# Patient Record
Sex: FEMALE | Race: OTHER | Employment: FULL TIME | URBAN - METROPOLITAN AREA
[De-identification: names, ages, dates, MRNs, and addresses within clinical notes are randomized per-mention and may not be internally consistent; named-entity substitution may affect disease eponyms.]

---

## 2021-08-10 NOTE — TELEPHONE ENCOUNTER
Patient calling to initiate prenatal care  LMP 7/11/21  Patient is 7-8 weeks on 9/5/21  Confirmation Ultrasound and Appointment scheduled on   Future Appointments   Date Time Provider Kodak Cruz   9/9/2021  3:30 PM [facility] OB PFLD US [facility] OB[facility]N P [facility] 12

## 2021-08-11 NOTE — TELEPHONE ENCOUNTER
LMP: 21  EDC based on lmp: 22    8 wks on ; appt on  at 8 4/7 wks        Are cycles regular?: yes    Medical problems: denies    Past sx hx: denies    Current medications: PNV- advised on folic acid and DHA    Past pregnancy complic

## 2021-09-09 NOTE — PROGRESS NOTES
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     LMP: 21  =  8w4d,      EDC: 22    Scan: CRL = 8w4d =

## 2021-10-11 NOTE — PROGRESS NOTES
Here for initial prenatal visit with our group. 33year old  at 13w1d by LMP. Patient's last menstrual period was 2021 (exact date). She has never had a pap smear. She has no medical history to complicate this pregnancy.      OB History

## 2021-10-18 NOTE — TELEPHONE ENCOUNTER
Patient had questions regarding lab results and how long it takes [id number] to come back. Advised patient. No further questions or concerns.

## 2021-11-09 PROBLEM — Z34.90 ENCOUNTER FOR SUPERVISION OF NORMAL PREGNANCY, ANTEPARTUM (HCC): Status: ACTIVE | Noted: 2021-11-09

## 2021-11-09 PROBLEM — R87.810 CERVICAL HIGH RISK HUMAN PAPILLOMAVIRUS (HPV) DNA TEST POSITIVE: Status: ACTIVE | Noted: 2021-11-09

## 2021-11-09 NOTE — PROGRESS NOTES
Low risk cfDNA   Male fetus    HARPREET. 17w2d    Doing well. No complaints. Denies abdominal/pelvic pain or vaginal bleeding.    Rh pos   Genetic screening - low risk cfDNA   Recommend Anatomy scan in 3-4 weeks  Prenatal labs reviewed     RTC in 4 weeks

## 2021-12-06 NOTE — PROGRESS NOTES
21w1d seen for routine OB visit. Denies ctx, lof, vb. Reports +FM.     Patient Active Problem List:     Cervical high risk human papillomavirus (HPV) DNA test positive     Encounter for supervision of normal pregnancy, antepartum       Gen: AAOx3,

## 2022-01-03 PROBLEM — Z34.02 ENCOUNTER FOR SUPERVISION OF NORMAL FIRST PREGNANCY IN SECOND TRIMESTER (HCC): Status: ACTIVE | Noted: 2021-11-09

## 2022-01-03 NOTE — PROGRESS NOTES
HARPREET  Doing well,  GOOD FM  Denies VB/LOF/uctx  Couseled on TDAP  Advised completion of labs  RTC in 3-4 wks

## 2022-01-10 PROBLEM — O99.019 ANEMIA DURING PREGNANCY (HCC): Status: ACTIVE | Noted: 2022-01-10

## 2022-01-10 PROBLEM — O99.810 ABNORMAL GLUCOSE TOLERANCE TEST IN PREGNANCY (HCC): Status: ACTIVE | Noted: 2022-01-10

## 2022-02-03 NOTE — PROGRESS NOTES
Patient presents with:  Prenatal Care: tia    Routine prenatal visit. Patient without complaints. Good fetal movement  Patient denies any bleeding, leaking fluid, cramping, or contractions. Assessment/Plan:  29w4d doing well  1 hour GTT, CBC done. Needs 3 hr GTT  HIV ordered. Anemia- encouraged Fe supplement  Blood type A pos  Reviewed vaccine recommendations: Tdap today, Covid needs booster. Kick count information given. Reviewed  labor signs and symptoms. Diagnoses and all orders for this visit:    Prenatal care, antepartum  -     URINALYSIS NONAUTO W/O SCOPE (OB URISTIX)    Need for vaccination  -     TETANUS, DIPHTHERIA TOXOIDS AND ACELLULAR PERTUSIS VACCINE (TDAP), >7 YEARS, IM USE      Return in about 2 weeks (around 2022) for Routine Prenatal Visit, Labs Next Available.

## 2022-02-07 PROBLEM — O24.419 GESTATIONAL DIABETES MELLITUS (GDM), ANTEPARTUM (HCC): Status: ACTIVE | Noted: 2022-02-07

## 2022-02-07 NOTE — PROGRESS NOTES
Results are diagnostic for gestational diabetes. Referral placed to diabetic education, patient to start fasting and 2hr postprandial blood sugar checks.

## 2022-02-09 NOTE — PROGRESS NOTES
[facility]   1990 was seen for Gestational Diabetes Counseling: Individual    Date: 2022  Referring Provider: Dr. 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 This week wake-up wrap at DD  L: leftovers or frozen Lean Cuisine etc with jello or fruit  Sn: fruit popsicle  D: slow cooker chicken, ground beef/taco night  HS: none  Beverages: coffee small every other day, cranberry juice, water  Exercise: no time or energy, the usual household and work/school activities    Education:     GDM Overview:  Reviewed gestational diabetes as diagnosis including target blood glucose values. Benefits, risks, and management options for improving/maintaining glucose control to mother/baby discussed. Healthy Eating:  Discussed nutrition concepts for pregnancy/healthy eating and effects of food on BG value. Timing of meals; what is a carbohydrate, protein, fat. Taught: Carb counting, label reading, meal planning. Suggested Calorie Level: 2000    Being Active:  Benefits and effects of activity on BG discussed. Reviewed types of recommended activity, duration, precautions, and when to call MD.    Monitoring:  Instructed on how to use glucose monitor/proper lancet disposal. Testing schedules are:   Fastin-95 mg/dL, Call MD is >105 md/dL twice in 1 week   2 Hour Post Prandial:  120 md/dL, Call MD if >140 md/dL twice in 1 week. Instructed /demonstrated ability to perform blood glucose testing on: One Touch Verio Reflect   mg/dL after a snack  Discussed monitoring ketones. Taking Medication:  Reviewed when medication might be indicated. Reducing Risk:  Effects of elevated blood glucose on mother/baby reviewed.   Discussed management (hyperglycemia, hypoglycemia, sick day, DKA, other) and when to call provider. Post pregnancy management/prevention of Type 2 DM, and increased risk of having diabetes later in life reviewed. Healthy Coping:  Family involvement/social support encouraged. Identification of lifestyle behaviors willing to change discussed. Training Tools Provided:  [place] Diabetes and Pregnancy: A guide to self management  BG Log Sheets    Recommendations:      1. Follow recommended meal plan. 2. Begin testing fasting glucose and 2 hour after meals   3. Bring glucose / food log to next MD office visit. 4. Encouraged activity if no restrictions. 5. Encouraged Anjelmikel Bhanu to call diabetes center with any questions or concerns. Script for One Touch Verio test strips and One Touch Delica lancets sent to preferred pharmacy. Patient verbalized understanding and has no further questions at this time. Emailed/written materials provided for all topics covered.   Thank you for the referral.  Scheduled pt to follow-up x 1 with the Diabetes Center 2/23  [facility] MS, RD, [address]

## 2022-02-17 PROBLEM — Z34.03 ENCOUNTER FOR SUPERVISION OF NORMAL FIRST PREGNANCY IN THIRD TRIMESTER (HCC): Status: ACTIVE | Noted: 2021-11-09

## 2022-02-17 PROBLEM — Z34.02 ENCOUNTER FOR SUPERVISION OF NORMAL FIRST PREGNANCY IN SECOND TRIMESTER (HCC): Status: RESOLVED | Noted: 2021-11-09 | Resolved: 2022-02-17

## 2022-02-17 NOTE — PROGRESS NOTES
HARPREET  Doing well, occasional Aroldo Rodrigez  GOOD FM  Denies VB/LOF/uctx  She has had 1 elevated fasting blood sugar, 2 post- prandial have been elevated. She is having difficulty checking after dinner because she goes to bed right away. Encouraged compliance.    RTC in 2 wks  Fetal movement instructions given

## 2022-02-23 NOTE — PROGRESS NOTES
14 [facility]  7/11/1990 was seen for Gestational Diabetes Education Follow-Up    2/23/2022    Referring Provider: Dr. Gloria Ragland Start time: 3:30  End time: 4:00      Diet:  pt reports she is having carbs and protein with each meal as instructed and snacks between roly at HS. FBS's:  Most are less than 95; 96 x 1; 98 x 1  Post-Prandials:  Most are <120    Hypoglycemia: declines but discussed Rule of 15 for treating hypoglycemia    Calcium intake: yogurt, milk. Having some cheese. Reinforced 4 calcium servings/day while pregnant and breastfeeding. 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 Discussed increased risk of Type 2 diabetes within the next 5-15 years especially if overweight and/or with a family history of Type 2 diabetes. Discussed importance of regular physical activity and weight control after breastfeeding completed. Gave her instruction for MyFitness Pal to help reduce weight after breastfeeding completed. Patient verbalized understanding and has no further questions at this time. No further follow-up anticipated for diabetes education.   Thank you for the referral.  Jesse Owens MS, RD, CDCES, LDN

## 2022-03-05 NOTE — PROGRESS NOTES
HARPREET    GA: 33w6d   22  1018   BP: 100/60   Weight: 188 lb (85.3 kg)       Doing well, +FM  Denies LOF/VB/uctx  Rh positive, TDAP received, EPDS 4  PTL and Fetal movement instructions given  GDMA1, patient reports she has not been as consistent checking her blood sugars. However reports that her fastings are less than 95 and her postprandials overall are less than 120 with an occasional 130s. Patient advised to continue to check her blood sugars 4 times a day. Patient advised to send blood sugar report via [employer]. Discussed with patient importance of monitoring blood glucose in pregnancy. Discussed with patient left upper quadrant abdominal pain. Abdominal pain near the subcostal region. Likely musculoskeletal.  No current pain. Discussed with patient the use of maternity shirts, increase water, warm/cold compresses and stretching as needed. Precautions provided. RTC in 2 wks      Note to patient and family   The [facility] makes medical notes available to patients in the interest of transparency. However, please be advised that this is a medical document. It is intended as pyrb-aw-istf communication. It is written and medical language may contain abbreviations or verbiage that are technical and unfamiliar. It may appear blunt or direct. Medical documents are intended to carry relevant information, facts as evident, and the clinical opinion of the practitioner.

## 2022-03-21 PROBLEM — Z34.90 PREGNANCY (HCC): Status: ACTIVE | Noted: 2022-03-21

## 2022-03-21 NOTE — PROGRESS NOTES
HARPREET  Doing well, +FM  Denies VB/LOF/uctx  Mode of delivery:   anticipated  Labor precautions discussed   GBS collected   She notes significant increase in swelling over the last week, more in the last few days.    She denies any headaches, vision changes, epigastric pain  She has 2+ pitting edema to BLE  Will send to L&D to rule out PIH

## 2022-03-22 PROBLEM — O14.93 PRE-ECLAMPSIA IN THIRD TRIMESTER (HCC): Status: ACTIVE | Noted: 2022-03-22

## 2022-03-22 NOTE — PLAN OF CARE
Problem: ANTEPARTUM/LABOR and DELIVERY  Goal: Maintain pregnancy as long as maternal and/or fetal condition is stable  Description: INTERVENTIONS:  - Maternal surveillance  - Fetal surveillance  - Monitor uterine activity  - Medications as ordered  - Bedrest  3/22/2022 0047 by Marko Reddy RN  Outcome: Progressing  3/22/2022 0046 by Marko Reddy RN  Outcome: Progressing  Goal: Anxiety is at manageable level  Description: INTERVENTIONS:  - [place] patient to unit/surroundings  - Establish a trusting relationship with patient  - Discuss possible complications or alterations in birth plan  - Explain treatment plan  - Explain testing/procedures prior to initiation  - Encourage participation in care  - Encourage verbalization of concerns/fears  - Identify coping mechanisms  - Administer/offer alternative therapies (Aroma therapy, distraction, guided imagery, massage, music therapy, therapeutic touch)  - Manage patient's environment (Avoid overstimulation of patient)  3/22/2022 0047 by Marko Reddy RN  Outcome: Progressing  3/22/2022 0046 by Marko Reddy RN  Outcome: Progressing  Goal: Demonstrates ability to cope with hospitalization/illness  Description: INTERVENTIONS:  - Encourage verbalization of feelings/concerns/expectations  - Provide quiet environment  - Assist patient to identify own strengths and abilities  - Encourage patient to set small goals for self  - Encourage participation in diversional activity  - Reinforce positive adaptation of new coping behaviors  - Include patient/family/caregiver in decisions  3/22/2022 0047 by Marko Reddy RN  Outcome: Progressing  3/22/2022 0046 by Marko Reddy RN  Outcome: Progressing     Problem: BIRTH - VAGINAL/ SECTION  Goal: Fetal and maternal status remain reassuring during the birth process  Description: INTERVENTIONS:  - Monitor vital signs  - Monitor fetal heart rate  - Monitor uterine activity  - Monitor labor progression (vaginal delivery)  - DVT prophylaxis (C/S delivery)  - Surgical antibiotic prophylaxis (C/S delivery)  3/22/2022 0047 by Dasha Ritter RN  Outcome: Progressing  3/22/2022 0046 by Dasha Ritter RN  Outcome: Progressing     Problem: PAIN - ADULT  Goal: Verbalizes/displays adequate comfort level or patient's stated pain goal  Description: INTERVENTIONS:  - Encourage pt to monitor pain and request assistance  - Assess pain using appropriate pain scale  - Administer analgesics based on type and severity of pain and evaluate response  - Implement non-pharmacological measures as appropriate and evaluate response  - Consider cultural and social influences on pain and pain management  - Manage/alleviate anxiety  - Utilize distraction and/or relaxation techniques  - Monitor for opioid side effects  - Notify MD/LIP if interventions unsuccessful or patient reports new pain  - Anticipate increased pain with activity and pre-medicate as appropriate  3/22/2022 0047 by Dasha Ritter RN  Outcome: Progressing  3/22/2022 0046 by Dasha Ritter RN  Outcome: Progressing

## 2022-03-22 NOTE — PLAN OF CARE
Problem: ANTEPARTUM/LABOR and DELIVERY  Goal: Maintain pregnancy as long as maternal and/or fetal condition is stable  Description: INTERVENTIONS:  - Maternal surveillance  - Fetal surveillance  - Monitor uterine activity  - Medications as ordered  - Bedrest  Outcome: Progressing  Goal: Anxiety is at manageable level  Description: INTERVENTIONS:  - [place] patient to unit/surroundings  - Establish a trusting relationship with patient  - Discuss possible complications or alterations in birth plan  - Explain treatment plan  - Explain testing/procedures prior to initiation  - Encourage participation in care  - Encourage verbalization of concerns/fears  - Identify coping mechanisms  - Administer/offer alternative therapies (Aroma therapy, distraction, guided imagery, massage, music therapy, therapeutic touch)  - Manage patient's environment (Avoid overstimulation of patient)  Outcome: Progressing  Goal: Demonstrates ability to cope with hospitalization/illness  Description: INTERVENTIONS:  - Encourage verbalization of feelings/concerns/expectations  - Provide quiet environment  - Assist patient to identify own strengths and abilities  - Encourage patient to set small goals for self  - Encourage participation in diversional activity  - Reinforce positive adaptation of new coping behaviors  - Include patient/family/caregiver in decisions  Outcome: Progressing

## 2022-03-22 NOTE — TELEPHONE ENCOUNTER
----- Message from Yelitza Griffiths MD sent at 3/22/2022 10:50 AM CDT -----  Induction scheduled Monday 3/28/22 9 pm cytotec.   x

## 2022-03-22 NOTE — CM/SW NOTE
Team rounds done on patient. Team reviewed MD orders and possible discharge needs. Team members present: LEELEE Mckinnon; Vish Sharpe, WILLIAM Case Manager; and RN caring for patient.

## 2022-03-22 NOTE — PROGRESS NOTES
FETAL NONSTRESS TEST:  Baseline FHR: 120-130 per minute  Fetal heart variability: moderate  Fetal Heart Rate accelerations: 15x15   Fetal Heart Rate decelerations: none  Tracing category 1  Fetal Non-stress Test Interpretation: reactive    Tita Downing MD  [address] [place] [address] & Gynecology

## 2022-03-22 NOTE — PLAN OF CARE
Problem: ANTEPARTUM/LABOR and DELIVERY  Goal: Maintain pregnancy as long as maternal and/or fetal condition is stable  Description: INTERVENTIONS:  - Maternal surveillance  - Fetal surveillance  - Monitor uterine activity  - Medications as ordered  - Bedrest  Outcome: Progressing  Goal: Anxiety is at manageable level  Description: INTERVENTIONS:  - [place] patient to unit/surroundings  - Establish a trusting relationship with patient  - Discuss possible complications or alterations in birth plan  - Explain treatment plan  - Explain testing/procedures prior to initiation  - Encourage participation in care  - Encourage verbalization of concerns/fears  - Identify coping mechanisms  - Administer/offer alternative therapies (Aroma therapy, distraction, guided imagery, massage, music therapy, therapeutic touch)  - Manage patient's environment (Avoid overstimulation of patient)  Outcome: Progressing  Goal: Demonstrates ability to cope with hospitalization/illness  Description: INTERVENTIONS:  - Encourage verbalization of feelings/concerns/expectations  - Provide quiet environment  - Assist patient to identify own strengths and abilities  - Encourage patient to set small goals for self  - Encourage participation in diversional activity  - Reinforce positive adaptation of new coping behaviors  - Include patient/family/caregiver in decisions  Outcome: Progressing     Problem: BIRTH - VAGINAL/ SECTION  Goal: Fetal and maternal status remain reassuring during the birth process  Description: INTERVENTIONS:  - Monitor vital signs  - Monitor fetal heart rate  - Monitor uterine activity  - Monitor labor progression (vaginal delivery)  - DVT prophylaxis (C/S delivery)  - Surgical antibiotic prophylaxis (C/S delivery)  Outcome: Progressing     Problem: PAIN - ADULT  Goal: Verbalizes/displays adequate comfort level or patient's stated pain goal  Description: INTERVENTIONS:  - Encourage pt to monitor pain and request assistance  - Assess pain using appropriate pain scale  - Administer analgesics based on type and severity of pain and evaluate response  - Implement non-pharmacological measures as appropriate and evaluate response  - Consider cultural and social influences on pain and pain management  - Manage/alleviate anxiety  - Utilize distraction and/or relaxation techniques  - Monitor for opioid side effects  - Notify MD/LIP if interventions unsuccessful or patient reports new pain  - Anticipate increased pain with activity and pre-medicate as appropriate  Outcome: Progressing

## 2022-03-22 NOTE — PROGRESS NOTES
Patient transported to [facility] via wheelchair in stable condition.  No c/o. Greg Mendez RN

## 2022-03-23 NOTE — PROGRESS NOTES
Pt discharged to home with written instructions per wheelchair. S& S of pre eclampsia reviewed. Pt expresses verbal understanding. Follow up instructions reviewed and pt states she has made all appointments before induction Monday.

## 2022-03-25 NOTE — PROGRESS NOTES
Patient presents with:  Prenatal Care: tia after nst    Routine prenatal visit without complaints. Patient denies any bleeding, leaking fluid, cramping, or regular uterine contractions. Good fetal movement. NST: see report. Reactive  Assessment/Plan:  36w5d doing well  GBS neg  Preeclampsia- Induction Monday night. No headaches, visual changes or epigastric pain. Reviewed labor signs and symptoms. Diagnoses and all orders for this visit:    Prenatal care, antepartum  -     URINALYSIS NONAUTO W/O SCOPE (OB URISTIX)    Pre-eclampsia in third trimester  -     FETAL NON-STRESS TEST EMG ONLY 01442; Future    Diet controlled gestational diabetes mellitus (GDM), antepartum  -     FETAL NON-STRESS TEST EMG ONLY 82068; Future       Return in about 6 weeks (around 5/6/2022) for Postpartum Visit.

## 2022-03-28 NOTE — TELEPHONE ENCOUNTER
LA paperwork dropped off no payment made today pt will call with payment placed in [facility]

## 2022-03-28 NOTE — TELEPHONE ENCOUNTER
----- Message from Haile Art MD sent at 3/25/2022  2:41 PM CDT -----  Patient scheduled for induction Monday night 9 pm.  x

## 2022-03-29 NOTE — PROGRESS NOTES
Pt transferred to 1107, vital signs stbale for pt and baby on transfer, All pt belongings sent with pt on transfer

## 2022-03-29 NOTE — PROGRESS NOTES
Pt complaints of x1 gush of fluid leak @ 0925am  No speculum exam  NItrazine swab turned blue on swabbing  Ferning negative in the slide

## 2022-03-29 NOTE — PLAN OF CARE
Problem: BIRTH - VAGINAL/ SECTION  Goal: Fetal and maternal status remain reassuring during the birth process  Description: INTERVENTIONS:  - Monitor vital signs  - Monitor fetal heart rate  - Monitor uterine activity  - Monitor labor progression (vaginal delivery)  - DVT prophylaxis (C/S delivery)  - Surgical antibiotic prophylaxis (C/S delivery)  Outcome: Progressing     Problem: PAIN - ADULT  Goal: Verbalizes/displays adequate comfort level or patient's stated pain goal  Description: INTERVENTIONS:  - Encourage pt to monitor pain and request assistance  - Assess pain using appropriate pain scale  - Administer analgesics based on type and severity of pain and evaluate response  - Implement non-pharmacological measures as appropriate and evaluate response  - Consider cultural and social influences on pain and pain management  - Manage/alleviate anxiety  - Utilize distraction and/or relaxation techniques  - Monitor for opioid side effects  - Notify MD/LIP if interventions unsuccessful or patient reports new pain  - Anticipate increased pain with activity and pre-medicate as appropriate  Outcome: Progressing     Problem: ANXIETY  Goal: Will report anxiety at manageable levels  Description: INTERVENTIONS:  - Administer medication as ordered  - Teach and rehearse alternative coping skills  - Provide emotional support with 1:1 interaction with staff  Outcome: Progressing     Problem: Patient/Family Goals  Goal: Patient/Family Long Term Goal  Description: Patient's Long Term Goal: Uncomplicated vaginal delivery    Interventions:  VS per protocol  I&O  Ice chips and sips as tolerated  EFM per protocol  Maintain IV as ordered  Antibiotics as needed per protocol  Informed consent       Interventions:  -   - See additional Care Plan goals for specific interventions  Outcome: Progressing  Goal: Patient/Family Short Term Goal  Description: Patient's Short Term Goal: Adequate pain control with delivery of infant  Interventions:  Pain assessment scores as ordered  Patient scores pain a \"3\" or less  Multidisciplinary care   Nonpharmacologic comfort measures         Interventions:   -   - See additional Care Plan goals for specific interventions  Outcome: Progressing

## 2022-03-29 NOTE — PROGRESS NOTES
pt at 37.1 weeks gest presents to L+D for scheduled IOL for pre-eclampsia. Pt is accompanied by spouse. Pt signs consents and is oriented to unit and room.  This RN to place pt on efm and toco and obtain VS.

## 2022-03-29 NOTE — ANESTHESIA PROCEDURE NOTES
Labor Analgesia  Performed by: Manuela Rodarte MD  Authorized by: Manuela Rodarte MD       General Information and Staff    Start Time:  3/29/2022 11:30 AM  End Time:  3/29/2022 11:40 AM  Anesthesiologist:  Manuela Rodarte MD  Performed by:   Anesthesiologist  Patient Location:  OB  Site Identification: surface landmarks    Reason for Block: labor epidural    Preanesthetic Checklist: patient identified, IV checked, risks and benefits discussed, monitors and equipment checked, pre-op evaluation, timeout performed, IV bolus, anesthesia consent and sterile technique used      Procedure Details    Patient Position:  Sitting  Prep: ChloraPrep    Monitoring:  Heart rate and continuous pulse ox  Approach:  Midline    Epidural Needle    Injection Technique:  GEOVANNY saline  Needle Type:  Tuohy  Needle Gauge:  17 G  Needle Length:  3.375 in  Needle Insertion Depth:  4  Location:  L3-4    Spinal Needle      Catheter    Catheter Type:  End hole  Catheter Size:  19 G  Catheter at Skin Depth:  9  Test Dose:  Negative    Assessment  Sensory Level:  T4    Additional Comments     Fentanyl 2 mc/ml + Ropivicaine 0.15% epidural infusion 12cc/hr  Fentanyl 2 mc/ml + Ropivicaine 0.15% epidural bolus 6cc with fentanyl 100 mcg

## 2022-03-29 NOTE — L&D DELIVERY NOTE
Ion Downing [[id number]]    Labor Events     labor?: No   steroids?: None  Cervical ripening date/time: 3/28/2022 2236  Cervical ripening type: Misoprostol  Antibiotics (enter # doses in comment): none  Rupture date/time: 3/29/2022 1155     Rupture type: AROM  Fluid color: Clear  Induction: Misoprostol, Oxytocin, AROM  Indications for induction: Mild Preeclampsia, IDDM/GDDM  Augmentation: None  Intrapartum & labor complications: Variable decelerations, Preeclampsia     Labor Length    1st stage: 4h 23m  2nd stage: 0h 46m  3rd stage: 0h 03m     Labor Event Times    Labor onset date/time: 3/29/2022 0900  Dilation complete date/time: 3/29/2022 1323  Start pushing date/time: 3/29/2022 1345     [place] Presentation    Presentation: Vertex  Position: Left Occiput Anterior     Operative Delivery    Operative Vaginal Delivery: N/A            Shoulder Dystocia    Shoulder Dystocia: N/A     Anesthesia    Method: Epidural           Delivery    Delivery date/time:  3/29/22 14:09:00   Delivery type: Normal spontaneous vaginal delivery    Details:     Delivery location: delivery room     Delivery Providers    Delivering Clinician: Aria Green MD   Delivery personnel:  Provider Role   Oksana Rivero RN Baby Nurse   Hector Handy, RN Delivery Nurse         Cord    Vessels: 3 Vessels  Complications: None  Time in sec: 43  Cord blood disposition: to lab  Gases sent?: No     Resuscitation    Method: None      Measurements    Weight: 3500 g 7 lb 11.5 oz Length: 49.5 cm   Head circum. : 34 cm Chest circum.: 33 cm      Abdominal circum.: 32 cm       Placenta    Date/time: 3/29/2022 1412  Removal: Spontaneous  Appearance: Intact  Disposition: held for future pathology     Apgars    Living status: Living   Apgar Scoring Key:    0 1 2    Skin color Blue or pale Acrocyanotic Completely pink    Heart rate Absent <100 bpm >100 bpm    Reflex irritability No response Grimace Cry or active withdrawal Muscle tone Limp Some flexion Active motion    Respiratory effort Absent Weak cry; hypoventilation Good, crying              1 Minute:  5 Minute:  10 Minute:  15 Minute:  20 Minute:    Skin color: 1  1       Heart rate: 2  2       Reflex irritablity: 2  2       Muscle tone: 2  2       Respiratory effort: 2  2       Total: 9  9          Apgars assigned by: MARISOL THOMAS  [place] disposition: with mother     Skin to Skin    Skin to skin initiated date/time: 3/29/2022 1430  Skin to skin with: Mother     Vaginal Count    Initial count RN: Yvrose Garcia RN  Initial count Tech: Andrae Mg   Sponges   Sharps    Initial counts 11   0    Final counts 21   3    Final count RN: Yvrose Garcia RN  Final count MD: Oumou Baldwin MD     Delivery (Maternal)    Episiotomy: None  Perineal lacerations: 2nd Repaired?: Yes   Vaginal laceration?: No    Cervical laceration?: No    Clitoral laceration?: No Repaired?: No   Quantitative blood loss (mL): 567            Vaginal Delivery Note    [facility] Patient Status:  Inpatient    1990 MRN [id number]   Location [address] [place] [address] Attending Dameon James MD   [facility] PCP Unknown Pcp     Date of Delivery: 22    Pre Op Dx:  IUP at 37w2d, Preeclampsia without severe features, A1GDM    Post Op Dx: Same - delivered    Procedure: Normal Spontaneous Vaginal Delivery    Surgeon: Karthik Quigley MD      Anesthesia: Epidural    QBL:  567 cc      Findings:      Sex: Male     \"Jerome\"   Weight: 3500 g        Apgars: 9/9    Nuchal cord: None    Lacerations: 2nd degree perineal laceration      Procedure: The patients was placed in the dorsolithotomy position and prepped. She was encouraged to push. As the head was delivered in OA position, the legs were lowered and the perineum was supported to decrease the risk of tearing. The shoulders rotated easily and delivery was completed without complication. Bulb suction was performed.   The cord was doubly clamped then cut after 30 seconds of cord pulsation noted. The baby was placed on the mother's abdomen at her request.  The cord blood was sampled and/or banked. Placenta delivered spontaneosly by uterine massage. IV Pitocin was then initiated. The uterus was explored and evacuation of blood clots noted. Uterus noted to be firm. Good hemostasis noted. The perineum, vaginal mucosa and cervix were then examined. The 2nd degree perineal laceration repaired with 2-0 vicryl. Bleeding was minimal.  The patient was then moved to the supine position in stable condition. Sponge and instrument counts were correct. Complications:  None    Mother and infant in good condition.     MD Joshua   [address]

## 2022-03-30 NOTE — PROGRESS NOTES
Report received from RADHA BURKETT [facility] for transfer of care to M/B after magnesium D/C'd. Pt resting in bed w/baby & SO @ bedside . POC reviewed w/pt.

## 2022-03-31 NOTE — PLAN OF CARE
Problem: POSTPARTUM  Goal: Long Term Goal:Experiences normal postpartum course  Description: INTERVENTIONS:  - Assess and monitor vital signs and lab values. - Assess fundus and lochia. - Provide ice/sitz baths for perineum discomfort. - Monitor healing of incision/episiotomy/laceration, and assess for signs and symptoms of infection and hematoma. - Assess bladder function and monitor for bladder distention.  - Provide/instruct/assist with pericare as needed. - Provide VTE prophylaxis as needed. - Monitor bowel function.  - Encourage ambulation and provide assistance as needed. - Assess and monitor emotional status and provide social service/psych resources as needed. - Utilize standard precautions and use personal protective equipment as indicated. Ensure aseptic care of all intravenous lines and invasive tubes/drains.  - Obtain immunization and exposure to communicable diseases history. Outcome: Completed  Goal: Optimize infant feeding at the breast  Description: INTERVENTIONS:  - Initiate breast feeding within first hour after birth. - Monitor effectiveness of current breast feeding efforts. - Assess support systems available to mother/family.  - Identify cultural beliefs/practices regarding lactation, letdown techniques, maternal food preferences. - Assess mother's knowledge and previous experience with breast feeding.  - Provide information as needed about early infant feeding cues (e.g., rooting, lip smacking, sucking fingers/hand) versus late cue of crying.  - Discuss/demonstrate breast feeding aids (e.g., infant sling, nursing footstool/pillows, and breast pumps). - Encourage mother/other family members to express feelings/concerns, and actively listen. - Educate father/SO about benefits of breast feeding and how to manage common lactation challenges.   - Recommend avoidance of specific medications or substances incompatible with breast feeding.  - Assess and monitor for signs of nipple pain/trauma. - Instruct and provide assistance with proper latch. - Review techniques for milk expression (breast pumping) and storage of breast milk. Provide pumping equipment/supplies, instructions and assistance, as needed. - Encourage rooming-in and breast feeding on demand.  - Encourage skin-to-skin contact. - Provide LC support as needed. - Assess for and manage engorgement. - Provide breast feeding education handouts and information on community breast feeding support. Outcome: Completed  Goal: Establishment of adequate milk supply with medication/procedure interruptions  Description: INTERVENTIONS:  - Review techniques for milk expression (breast pumping). - Provide pumping equipment/supplies, instructions, and assistance until it is safe to breastfeed infant. Outcome: Completed  Goal: Appropriate maternal -  bonding  Description: INTERVENTIONS:  - Assess caregiver- interactions. - Assess caregiver's emotional status and coping mechanisms. - Encourage caregiver to participate in  daily care. - Assess support systems available to mother/family.  - Provide /case management support as needed.   Outcome: Completed     Problem: COPING  Goal: Pt/Family able to verbalize concerns and demonstrate effective coping strategies  Description: INTERVENTIONS:  - Assist patient/family to identify coping skills, available support systems and cultural and spiritual values  - Provide emotional support, including active listening and acknowledgement of concerns of patient and caregivers  - Reduce environmental stimuli, as able  - Instruct patient/family in relaxation techniques, as appropriate  - Assess for spiritual and psychosocial needs and initiate Spiritual Care or Behavioral Health consult as needed  Outcome: Completed

## 2022-03-31 NOTE — PROGRESS NOTES
Baby breastfeeding fair and bottlefding well and mom pumping, all dc teaching reviewed earlier and all questions answered. Pt states comfortable caring for self and baby.   Discharged in stable condition with family and accompanied by staff at [phone] per wc

## 2022-04-01 NOTE — PROGRESS NOTES
1st attempt OBGYN Post Partum apt request  (delivered 03/29)    Dr SHEIKH [facility]  [facility]  [address]  [phone]  Follow up:  4 days - BP Check -  pt has existing BP check apt Mon 04/04 @1:00pm  6 weeks - pt has existing apt Wed 05/11 @12:15pm  LVM to call [phone] to assist w/scheduling apt; will try again  Closing encounter

## 2022-04-01 NOTE — TELEPHONE ENCOUNTER
Paperwork completed and signed. Faxed to [phone]. [facility] for pt its completed.     Copy sent to scanning  Copy in plfd

## 2022-04-03 NOTE — PROGRESS NOTES
04/03/22 1452   Cradle Call Follow up   Cradle call follow up date 04/03/22   Follow up needed Completed   Hypertension or Preeclampsia during pregnancy or delivery Yes   Did patient go home on any antihypertensive meds? No   Does patient have a follow-up appt within 2 weeks? Yes  (Monday; BP stable and denies CNS complaints)   Outgoing Cradle Call completed: Mom reports that she and infant are doing well. Has had pediatrician F/U visit. Reminded to schedule postpartum follow up visit. No complaints of PPD. Reviewed basic self and infant care. Encouraged to follow up w/ MD's w/ further question/concerns.   Invited to Cradle Talk Group

## 2022-04-06 NOTE — LACTATION NOTE
LACTATION NOTE - MOTHER      Evaluation Type: Outpatient Initial    Problems identified  Problems identified: Knowledge deficit;Milk supply not WNL; Nipple pain (Mild engorgement in axilla)  Milk supply not WNL: Reduced (potential)  Problems Identified Other: Infant born at 45 1/6 weeks gestation, sleepy feeder  Receiving supplements. Maternal history  Maternal history: Induction of labor;PIH;Gestational diabetes; Anemia  Other/comment: Received magnesium after delivery for elevated BP, venofer for Hgb 7.7    Breastfeeding goal  Breastfeeding goal: To maintain breast milk feeding per patient goal    Maternal Assessment  Bilateral Breasts: Full;Symmetrical (Mild engorgement upper outer quadrant near axilla)  Bilateral Nipples: Everted;Colostrum easily expressed; Sore  Prior breastfeeding experience (comment below): Primip  Breastfeeding Assistance: Breastfeeding assistance provided with permission    Pain assessment  Pain, additional: Pain w/initial sucks only  Location/Comment: Rated pain # 4 with initial latch then decreases. Denied discomfort with BF when assisted with deeper latch skills  Treatment of Sore Nipples: Deeper latch techniques; Expressed breast milk    Guidelines for use of:  Breast pump type: Spectra  Current use of pump[de-identified] 3 times at night  Suggested use of pump: Pump each time a supplement is offered  Reported pumping volumes (ml): 30-60  Post-feed pumped volume: 26 ml (R 12 ml/ L 14 ml)  Other (comment): Enc to use the breast pump if infant is sleepy with feedings.

## 2022-05-11 PROBLEM — Z34.03 ENCOUNTER FOR SUPERVISION OF NORMAL FIRST PREGNANCY IN THIRD TRIMESTER (HCC): Status: RESOLVED | Noted: 2021-11-09 | Resolved: 2022-05-11

## 2024-01-22 NOTE — TELEPHONE ENCOUNTER
Patient calling to initiate prenatal care  LMP 12/22  Patient is 7-8 weeks on 2/16  Confirmation Ultrasound and Appointment scheduled on   Future Appointments   Date Time Provider Department Center   2/21/2024 10:15 AM [facility] [address]   2/21/2024 12:15 PM Geetha Hale MD [facility] [address]   3/18/2024  1:30 PM Heron Marquez MD [facility] [place]         Any history of ectopic pregnancy? no  Any history of miscarriage? no  Any medications that you are taking on a regular basis other than prenatal vitamins? Was taking iron but stopped taking (if not taking prenatal vitamins, encourage patient to start taking.)  Any bleeding since the first day of last LMP and your positive pregnancy test? no    Insurance bcbs ppo

## 2024-02-21 ENCOUNTER — OFFICE VISIT (OUTPATIENT)
Dept: OBGYN CLINIC | Facility: CLINIC | Age: 34
End: 2024-02-21
Payer: COMMERCIAL

## 2024-02-21 ENCOUNTER — ULTRASOUND ENCOUNTER (OUTPATIENT)
Dept: OBGYN CLINIC | Facility: CLINIC | Age: 34
End: 2024-02-21
Payer: COMMERCIAL

## 2024-02-21 VITALS
DIASTOLIC BLOOD PRESSURE: 76 MMHG | WEIGHT: 208 LBS | SYSTOLIC BLOOD PRESSURE: 126 MMHG | HEART RATE: 94 BPM | BODY MASS INDEX: 35.51 KG/M2 | HEIGHT: 64 IN

## 2024-02-21 DIAGNOSIS — N91.1 SECONDARY AMENORRHEA: ICD-10-CM

## 2024-02-21 DIAGNOSIS — N91.2 AMENORRHEA: ICD-10-CM

## 2024-02-21 DIAGNOSIS — Z32.01 PREGNANCY EXAMINATION OR TEST, POSITIVE RESULT (HCC): Primary | ICD-10-CM

## 2024-02-21 PROBLEM — O99.210 OBESITY AFFECTING PREGNANCY, ANTEPARTUM (HCC): Status: ACTIVE | Noted: 2024-02-21

## 2024-02-21 PROBLEM — Z34.90 PREGNANCY (HCC): Status: RESOLVED | Noted: 2022-03-21 | Resolved: 2024-02-21

## 2024-02-21 PROBLEM — Z86.32 HISTORY OF GESTATIONAL DIABETES: Status: ACTIVE | Noted: 2024-02-21

## 2024-02-21 PROBLEM — O14.93 PRE-ECLAMPSIA IN THIRD TRIMESTER (HCC): Status: RESOLVED | Noted: 2022-03-22 | Resolved: 2024-02-21

## 2024-02-21 PROBLEM — O09.529 ANTEPARTUM MULTIGRAVIDA OF ADVANCED MATERNAL AGE (HCC): Status: ACTIVE | Noted: 2024-02-21

## 2024-02-21 PROBLEM — Z87.59 HISTORY OF PRE-ECLAMPSIA: Status: ACTIVE | Noted: 2024-02-21

## 2024-02-21 PROCEDURE — 76856 US EXAM PELVIC COMPLETE: CPT | Performed by: OBSTETRICS & GYNECOLOGY

## 2024-02-21 PROCEDURE — 99214 OFFICE O/P EST MOD 30 MIN: CPT | Performed by: OBSTETRICS & GYNECOLOGY

## 2024-02-21 PROCEDURE — 3078F DIAST BP <80 MM HG: CPT | Performed by: OBSTETRICS & GYNECOLOGY

## 2024-02-21 PROCEDURE — 3074F SYST BP LT 130 MM HG: CPT | Performed by: OBSTETRICS & GYNECOLOGY

## 2024-02-21 PROCEDURE — 3008F BODY MASS INDEX DOCD: CPT | Performed by: OBSTETRICS & GYNECOLOGY

## 2024-02-21 NOTE — PATIENT INSTRUCTIONS
The ADA and ACOG define patients at increased risk of type 2 diabetes based on: body mass index (BMI) ?25 kg/m2 (?23 kg/m2 in  Americans) plus one or more of the following [2,28]:    GDM in a previous pregnancy.    Glycated hemoglobin ?5.7 percent (39 mmol/mol), impaired glucose tolerance, or impaired fasting glucose on previous testing.    First-degree relative with diabetes.    High-risk race/ethnicity (eg, , , ,  American, ).    History of cardiovascular disease.    Hypertension (?140/90 mmHg) or on therapy for hypertension.    High-density lipoprotein cholesterol level <35 mg/dL (0.90 mmol/L) and/or a triglyceride level >250 mg/dL (2.82 mmol/L).    Polycystic ovary syndrome (PCOS).    Physical inactivity.    Other clinical condition associated with insulin resistance (eg, severe obesity, acanthosis nigricans).        Candidates    Low-dose aspirin 81 mg: Take 2 tablets by mouth daily starting at 12 weeks until 36 weeks      Selecting high risk women for prophylaxis -- The greatest benefit appears to be in women at moderate to high risk of developing the disease [10,13-20], in whom a 62 percent reduction of  preeclampsia occurred in the ASPRE trial [21]. We recommend low-dose aspirin prophylaxis for women at high risk for preeclampsia. There is no consensus on the exact criteria that confer high risk. It is reasonable to use the USPSTF high-risk criteria, which are also endorsed by the American College of Obstetricians and Gynecologists (ACOG) [22,23]. The incidence of preeclampsia is estimated to be at least 8 percent for pregnant women with any one of these high risk factors:    ?Previous pregnancy with preeclampsia, especially early onset and with an adverse outcome.    ?Type 1 or 2 diabetes mellitus.    ?Chronic hypertension.    ?Multifetal gestation.    ?Kidney disease.    ?Autoimmune disease with potential vascular complications  (antiphospholipid syndrome, systemic lupus erythematosus).      We generally follow the USPSTF criteria and recommend low-dose aspirin for preeclampsia prevention to patients with two or more of the following moderate risk factors [22]:    ?Nulliparity.    ?Obesity (body mass index >30 kg/m2).    ?Family history of preeclampsia in mother or sister.    ?Age ?35 years.    ?Sociodemographic characteristics (Black persons, lower income level [recognizing that these are not biological factors]).    ?Personal risk factors (eg, previous pregnancy with low birth weight or small for gestational age infant, previous adverse pregnancy outcome [eg, stillbirth], interval >10 years between pregnancies).           [facility]- Prenatal Testing    The following information is designed to help you understand some of the optional tests that are available to you to screen for problems in your pregnancy.  Before considering any of these tests, you will need to consider the following questions:    [1] What would you do if an abnormality were detected?  If the answer is nothing, then you may decide to decline all testing.  [2] Would you be willing to undergo testing which might increase your risk of miscarriage, such as an amniocentesis or CVS (see below)?  [3] If you have the initial testing, and it indicates that there might be a problem, and you subsequently decide not to do invasive testing such as an amniocentesis, would you worry excessively through the remainder of the pregnancy?    The following tests are available to screen for problems in your pregnancy:    [1]  First Trimester Screening (also called Nuchal Thickness, Nuchal Translucency or NT)- This is an abdominal ultrasound done between 10 and 14 weeks by a perinatology specialist (Maternal Fetal Medicine, MFM) which measures the thickness of the skin behind your baby's neck.  Increased thickness of the skin in this area has been linked to an increased risk of  genetic abnormalities like Down Syndrome.  A second visit may be required if the baby is not in the correct position.  The ultrasound results are combined with a finger stick blood test to increase the sensitivity of the test.  You will need to schedule an appointment with the Maternal Fetal Medicine office.  Address and phone number below:  Please verify insurance coverage:  CPT code: 34932 (savage) or 99506 (twins)  Diagnosis: AMA (advanced maternal age) - O09.529  Maternal Fetal Medicine  Dr. Suh, Dr. Diehl, Dr. Ricardo, and Dr. Joy  [address]  [place] [address]  Phone [phone]  Fax [phone]    [2] Cell-Free DNA testing (NIPT)- This is a blood test done any time after 10-11 weeks which screens for genetic abnormalities like Down Syndrome.  It is greater than 98% sensitive but requires an amniocentesis for confirmation if abnormal.  It can also tell you the sex of the baby.  CPT code: 33107  Diagnosis code: AMA (advanced maternal age) - O09.529  Testing options:   [facility]- preferred for IHP patients.  Please call [phone] to discuss billing, prior authorizations or referrals  [facility]- Preferred for patients who use [facility].  Please call [phone] to discuss billing, prior authorizations or referrals  [id number]- Optional for all insurance plans except [facility].  Please call [facility] at [phone] to discuss billing, prior authorizations, or referrals    [3]  Alpha Fetoprotein (AFP, MSAFP)- This is a simple blood test that screens for neural tube defects such as spina bifida (an abnormal opening in the spine).  It is usually performed around 16-20 weeks.  Additional testing such as a Level II ultrasound may be recommended for an abnormal test result.  Please verify insurance coverage:  CPT code: 19273 Diagnosis code: Genetic Screening- Z36.8A    [4] Cystic Fibrosis/SMA Carrier Screening- Cystic Fibrosis is a genetic disease which causes lung problems in the infant.  SMA  (spinal muscular atrophy) is a genetic disease that affects the nerve cells of the spinal cord.  These genetic defects would need to be passed from both parents to the child.  A blood test is performed on the mother, and if her test is positive, then the father should also be tested. These tests can be done at any time in the pregnancy, usually in the first trimester with your initial OB labs.  All babies are screened for cystic fibrosis after birth.  Please verify insurance coverage:  Cystic Fibrosis CPT Code: 76422 Diagnosis code: Cystic Fibrosis screening- Z13.228  SMA CPT Code: 37806 Diagnosis code: Genetic Screening- Z36.8A or Testing for Genetic Disease Carrier- Z13.71    [5]  Level II Ultrasound-  This is an abdominal ultrasound done at approximately 20-21 weeks by a perinatology specialist (JULIAN) at [facility] which looks at many of the baby's internal structures.  Abnormalities in certain structures have been associated with an increased risk of genetic abnormalities.  It also screens for NTD's.  This ultrasound would replace the Level I Ultrasound that we normally perform at our office around the same time.    [6]  Amniocentesis-  During this procedure, a needle is passed through your abdominal wall to withdrawal some amniotic fluid from around the baby.  It screens for both genetic abnormalities and NTD's and is usually performed around 15-16 weeks.  This test has the highest accuracy for detecting genetic abnormalities, but there may be a small risk of miscarriage or other complications.  A similar procedure called Chorionic Villus Sampling (CVS) is performed by passing a catheter through the vagina to remove a small sample of tissue from the placenta (afterbirth).  CVS may have a higher risk of miscarriage and other rare complications compared to amniocentesis but can be performed earlier in pregnancy.  Amniocentesis is often recommended/offered if any of the previously mentioned tests come back  abnormal.  A pamphlet is available if you would like more information about this option.  If you decide to have an amniocentesis, the other tests would be unnecessary.      The above information covers some of the basics.  Pamphlets on the Cell-Free DNA test, Quad Screen and Cystic Fibrosis test should be in your prenatal packet.  Your doctor will discuss this information in more detail, and feel free to ask additional questions.  Also, remember that all of these tests are optional, and will only be performed at your request.  Testing that needs to be done through the perinatologist's office may require 2-3 weeks lead time to schedule.              Foods to Avoid During Pregnancy  Deli Meats- You may eat deli meats from the deli.  If you eat deli meats in the package, it may be contaminated with Listeria. Heat all deli meats in a package to 165 degrees Fahrenheit before eating, even if the label states the meat is “pre-cooked”.    Soft Cheeses and Unpasteurized Products - You may eat soft cheeses that are pasteurized.  Please avoid all soft cheeses, milk or juice that is unpasteurized.  Fish- avoid fish that contains a high level of mercury. These include but are not limited to; Gates, Orange Roughy, Tile Fish, Shark, Swordfish, and Bravo Mackerel. Please see chart below for good fish choices in pregnancy. Also avoid any raw, uncooked shellfish such as oysters, clams, or sushi.    Make sure to cook all meats; including ground beef, pork, and poultry to their desired temperatures before consuming. This reduces the chance of a food borne illness.  Caffeine- limit to 200 mg or an 8oz cup daily or less.   Alcohol- no amount of alcohol is determined to be safe in pregnancy. Do not drink any alcoholic beverages while pregnant.        Medications Safe in Pregnancy  The following over-the-counter medications may be taken safely after 12 weeks gestation by any patient who is pregnant.  Please follow the instructions on the  package for adult dosage.  If you experience any symptoms prior to 12 weeks, please call the office at [phone].      Colds/Congestion: Flonase, Saline Nasal Spray, Neti Pot, Plain Robitussin, Robitussin DM, Mucinex DM, Vicks 44 E, Vicks Vapor rub, Cough drops without Zinc or Sudafed.   Contact your doctor if you have a persistent fever over 100.4, shortness of breath, coughing up green mucus, or a sore throat that persists from more than 3 days    Diarrhea: Imodium, Maalox Anti-Diarrheal or Kaopectate  Contact the office if you have diarrhea for more than 3 days.    Allergies: Benadryl, Claritin or Zyrtec    Hemorrhoids: Tucks pads, Preparation H, Annusol, Sitz bath or Witch hazel  Eat a high fiber diet and drink plenty of fluids.    Yeast: Monistat 3 or 7  Call if your symptoms do not improve, or if you experience vaginal bleeding, or a watery discharge.    Constipation: Metamucil, Colace, fiber supplement, Milk of Magnesia or Dulcolax  Drink plenty of fluids, eat high-fiber foods and take the above laxatives sporadically.     Headache or Mild aches and pain: Extra Strength Tylenol     Gas: Gas X, Gelusil, Papaya Tablets, Maalox, Mylicon or Mylanta Gas    Heartburn: Tums, Mylanta, Pepcid AC or Maalox    Sore throat: Alcohol free Chloraseptic spray or Lozenges     Nausea and Vomiting: ½ tablet Unisom plus 100mg of Vitamin B6 at bedtime (may increase B6 up to 200mg per day), Alejandra root tea or raspberry leaf tea    Insomnia: Tylenol PM, Vitamin B6 50mg, warm bath or milk, Unisom, Nytol or Sominex.     We have listed a few medications for common symptoms seen in pregnancy.  Please contact the office if you are unsure about any over the counter medications that are not listed above.

## 2024-02-21 NOTE — PROGRESS NOTES
[facility]  Obstetrics and Gynecology    Subjective:     Luba Lane is a 33 year old  female presents with c/o secondary amenorrhea and positive pregnancy test. The patient was recommended to return for further evaluation. The patient reports doing well. Patient's last menstrual period was 2023 (exact date)..     Review of Systems:  General: no complaints per category. See HPI for additional information.   Breast: no complaints per category. See HPI for additional information.   Respiratory: no complaints per category. See HPI for additional information.   Cardiovascular: no complaints per category. See HPI for additional information.   GI: no complaints per category. See HPI for additional information.   : no complaints per category. See HPI for additional information.   Heme: no complaints per category. See HPI for additional information.     OB History:   OB History    Para Term  AB Living   3 1 1 0 1 1   SAB IAB Ectopic Multiple Live Births   0 0 0 0 1      # Outcome Date GA Lbr Jerrell/2nd Weight Sex Delivery Anes PTL Lv   3 Current            2 Term 22 37w2d 04:23  00:46 7 lb 11.5 oz (3.5 kg) M NORMAL SPONT EPI N DMITRIY      Complications: Variable decelerations, Preeclampsia   1 AB 2017 7w0d              Gyne History:  Menarche: 9  Period Cycle (Days): Pregnant  Period Duration (Days): n/a  Period Flow: n/a  Hx Prior Abnormal Pap: Yes  Pap Date: 10/11/21  Pap Result Notes: abnormal  Patient's last menstrual period was 2023 (exact date).      Meds:  Current Outpatient Medications on File Prior to Visit   Medication Sig Dispense Refill    Prenatal 27-0.8 MG Oral Tab Take 1 tablet by mouth daily.      Ferrous Sulfate (IRON OR) Take by mouth. (Patient not taking: Reported on 2024)       No current facility-administered medications on file prior to visit.       All:  No Known Allergies    PMH:  Past Medical History:   Diagnosis Date    Anemia      Gestational diabetes (HCC)     Pregnancy-induced hypertension (HCC)        PSH:  Past Surgical History:   Procedure Laterality Date    OTHER  2017    elective         Objective:     Vitals:    24 1206   BP: 126/76   Pulse: 94   Weight: 208 lb (94.3 kg)   Height: 64\"         Body mass index is 35.7 kg/m².    General: AAO.NAD.   CVS exam: normal peripheral perfusion  Chest: non-labored breathing, no tachypnea   Abdominal exam: deferred   Pelvic exam: deferred          Imaging:  First Trimester US   GA by LMP: 8w5d   GA by US: 8w5d   FHT: 169 bpm   Impression: Single live IUP. Early viable. Gestational sac, yolk sac and fetal pole seen. Both ovaries wnl. No free fluid. Cardiac activity noted. Cervix appears closed and wnl.   NOEMI 24 by LMP     Reviewed and electronically signed by: Geetha Hale MD, 24, 12:40 PM        Assessment:     Luba Lane is a 33 year old  female who presents for secondary amenorrhea and positive pregnancy test          Plan:     Patient Active Problem List    Diagnosis    History of pre-eclampsia     Low dose ASA  [ ] baseline labs   [ ] PCR      History of gestational diabetes     [ ] early 1 hr GTT      Antepartum multigravida of advanced maternal age (HCC)     Low dose ASA  [ ] MFM w/ level 2 US  [ ] growth   [ ] NST         Obesity affecting pregnancy, antepartum (HCC)     [ ] early 1 hr GTT  [ ] MFM       Postpartum care and examination of lactating mother (HCC)    Gestational diabetes mellitus (GDM), antepartum (HCC)    Anemia during pregnancy (HCC)    Cervical high risk human papillomavirus (HPV) DNA test positive     Neg 16/18/45  Repeat cotesting in 1 year ()             Secondary amenorrhea  - a/w positive pregnancy test  - US noted for single viable IUP at 8w5d   - Pt counseled on safety, diet, exercise, caffiene, tobacco, ETOH, sexual activity, ER precautions, diet, exercise, appropriate otc medication use, substance abuse   -  Counseled on taking a PNV with 0.4mg of folic acid   - genetic screening testing d/w patient and family, pt declines invasive diagnostic testing and considering first versus second trimester screening   - advised to follow up to establish prenatal care   - SAB precautions provided   - d/w nausea and vomiting in pregnancy including vitamin B 6 and unisom   - COVID 19 precautions provided, recommend vaccination and booster if/when applicable     All of the findings and plan were discussed with the patient.  She notes understanding and agrees with the plan of care.  All questions were answered to the best of my ability at this time.      Total patient time was 38 minutes in evaluation, consultation, and coordination of care. This included face to face and non-face to face actions. The patient's questions and concerns that were addressed.     RTC in 4 weeks or sooner if needed     Geetha Hale MD   [facility] - OBGYN     Discussed with patient that there will not be further notification of normal or benign results other than receiving results on [employer]. A [employer] message or telephone call will be placed by the physician and/or office staff if results are abnormal.         Note to patient and family   The 21st Century Cures Act makes medical notes available to patients in the interest of transparency.  However, please be advised that this is a medical document.  It is intended as agoa-ei-rgqm communication.  It is written and medical language may contain abbreviations or verbiage that are technical and unfamiliar.  It may appear blunt or direct.  Medical documents are intended to carry relevant information, facts as evident, and the clinical opinion of the practitioner.      This note could include assistance by Dragon voice recognition. Errors in content may be related to improper recognition by the system; efforts to review and correct have been done but errors may still exist.

## 2024-03-18 ENCOUNTER — INITIAL PRENATAL (OUTPATIENT)
Dept: OBGYN CLINIC | Facility: CLINIC | Age: 34
End: 2024-03-18
Payer: COMMERCIAL

## 2024-03-18 ENCOUNTER — PATIENT MESSAGE (OUTPATIENT)
Dept: OBGYN CLINIC | Facility: CLINIC | Age: 34
End: 2024-03-18

## 2024-03-18 VITALS
HEART RATE: 101 BPM | WEIGHT: 209.13 LBS | SYSTOLIC BLOOD PRESSURE: 128 MMHG | HEIGHT: 64 IN | BODY MASS INDEX: 35.7 KG/M2 | DIASTOLIC BLOOD PRESSURE: 72 MMHG

## 2024-03-18 DIAGNOSIS — Z3A.12 12 WEEKS GESTATION OF PREGNANCY (HCC): Primary | ICD-10-CM

## 2024-03-18 DIAGNOSIS — Z12.4 CERVICAL CANCER SCREENING: ICD-10-CM

## 2024-03-18 DIAGNOSIS — O09.529 ANTEPARTUM MULTIGRAVIDA OF ADVANCED MATERNAL AGE (HCC): ICD-10-CM

## 2024-03-18 PROCEDURE — 87086 URINE CULTURE/COLONY COUNT: CPT | Performed by: STUDENT IN AN ORGANIZED HEALTH CARE EDUCATION/TRAINING PROGRAM

## 2024-03-18 PROCEDURE — 87591 N.GONORRHOEAE DNA AMP PROB: CPT | Performed by: STUDENT IN AN ORGANIZED HEALTH CARE EDUCATION/TRAINING PROGRAM

## 2024-03-18 PROCEDURE — 87491 CHLMYD TRACH DNA AMP PROBE: CPT | Performed by: STUDENT IN AN ORGANIZED HEALTH CARE EDUCATION/TRAINING PROGRAM

## 2024-03-18 PROCEDURE — 3074F SYST BP LT 130 MM HG: CPT | Performed by: STUDENT IN AN ORGANIZED HEALTH CARE EDUCATION/TRAINING PROGRAM

## 2024-03-18 PROCEDURE — 3008F BODY MASS INDEX DOCD: CPT | Performed by: STUDENT IN AN ORGANIZED HEALTH CARE EDUCATION/TRAINING PROGRAM

## 2024-03-18 PROCEDURE — 87625 HPV TYPES 16 & 18 ONLY: CPT | Performed by: STUDENT IN AN ORGANIZED HEALTH CARE EDUCATION/TRAINING PROGRAM

## 2024-03-18 PROCEDURE — 3078F DIAST BP <80 MM HG: CPT | Performed by: STUDENT IN AN ORGANIZED HEALTH CARE EDUCATION/TRAINING PROGRAM

## 2024-03-18 PROCEDURE — 87147 CULTURE TYPE IMMUNOLOGIC: CPT | Performed by: STUDENT IN AN ORGANIZED HEALTH CARE EDUCATION/TRAINING PROGRAM

## 2024-03-18 PROCEDURE — 87624 HPV HI-RISK TYP POOLED RSLT: CPT | Performed by: STUDENT IN AN ORGANIZED HEALTH CARE EDUCATION/TRAINING PROGRAM

## 2024-03-18 RX ORDER — ASPIRIN 81 MG/1
81 TABLET ORAL DAILY
COMMUNITY

## 2024-03-18 NOTE — PATIENT INSTRUCTIONS
[facility]  CLINIC  [address], [address]  [place] [address]  [phone]  Number of Visits: 1

## 2024-03-18 NOTE — PROGRESS NOTES
[facility]  Obstetrics and Gynecology  History & Physical    CC: Patient is here to establish prenatal care     Subjective:     HPI:  Luba Lane is a 33 year old  female at 12w3d who presents today to establish prenatal care. Patient reports improvement in n/v.  Denies vaginal bleeding, abdominal/pelvic pain.     LMP: Patient's last menstrual period was 2023 (exact date).  NOEMI:  Estimated Date of Delivery: 24  Dated by: LMP    OB:  OB History    Para Term  AB Living   3 1 1 0 1 1   SAB IAB Ectopic Multiple Live Births   0 0 0 0 1      # Outcome Date GA Lbr Jerrell/2nd Weight Sex Delivery Anes PTL Lv   3 Current            2 Term 22 37w2d 04:23 / 00:46 7 lb 11.5 oz (3.5 kg) M NORMAL SPONT EPI N DMITRIY      Complications: Variable decelerations, Preeclampsia   1 AB  7w0d              PMH:   Past Medical History:   Diagnosis Date    Anemia     Gestational diabetes (HCC)     Pregnancy-induced hypertension (HCC)        PSH:    Past Surgical History:   Procedure Laterality Date    OTHER  2017    elective         MEDS:  Current Outpatient Medications on File Prior to Visit   Medication Sig Dispense Refill    aspirin 81 MG Oral Tab EC Take 1 tablet (81 mg total) by mouth daily.      Ferrous Sulfate (IRON OR) Take by mouth.      Prenatal 27-0.8 MG Oral Tab Take 1 tablet by mouth daily.       No current facility-administered medications on file prior to visit.       Allergies:    No Known Allergies      Immunizations:  Immunization History   Administered Date(s) Administered    Covid-19 Vaccine Moderna 100 mcg/0.5 ml 02/10/2021, 03/10/2021, 2022    MMR 2022    TDAP 2022    Tb Intradermal Test 2017, 2017, 2018       Family Hx:      Family History   Problem Relation Age of Onset    Diabetes Father     Cancer Father        SocialHx:        Social History     Socioeconomic History    Marital status:    Tobacco Use     Smoking status: Never    Smokeless tobacco: Never   Vaping Use    Vaping Use: Never used   Substance and Sexual Activity    Alcohol use: Not Currently     Comment: occ    Drug use: Never    Sexual activity: Yes     Partners: Male   Other Topics Concern    Caffeine Concern Yes     Comment: OCC    Exercise No    Seat Belt Yes     Social Determinants of Health     Financial Resource Strain: Low Risk  (3/18/2024)    Financial Resource Strain     Difficulty of Paying Living Expenses: Not hard at all     Med Affordability: No   Food Insecurity: No Food Insecurity (3/18/2024)    Food Insecurity     Food Insecurity: Never true   Transportation Needs: No Transportation Needs (3/18/2024)    Transportation Needs     Lack of Transportation: No   Stress: No Stress Concern Present (3/18/2024)    Stress     Feeling of Stress : No   Housing Stability: Low Risk  (3/18/2024)    Housing Stability     Housing Instability: No       Review of Systems:  General:  denies fevers, chills, fatigue and malaise  Eyes:  no visual changes, denies headaches  Breast:  denies rashes, skin changes, pain, lumps or discharge   Respiratory:  denies SOB, dyspnea, cough or wheezing  Cardiovascular:  denies chest pain, palpitations  GI: denies abdominal pain, diarrhea, constipation  :  denies dysuria, hematuria, increased urinary frequency.   Heme:  denies history of excessive bleeding or bruising  Psychiatric:  denies depression, anxiety    Objective:     PE:  Vitals: /72   Pulse 101   Ht 64\"   Wt 209 lb 2 oz (94.9 kg)   LMP 12/21/2023 (Exact Date)   BMI 35.90 kg/m²   Gen: A/O, NAD  Head/Neck: neck supple, thyroid non-enlarged, symmetric and without nodules  Breasts: breasts symmetric, no dominant or suspicious mass, no skin or nipple changes and no axillary adenopathy  CV: normal peripheral perfusion   Lungs: no tachypnea, non-labored breathing   Abdominal exam: soft, nontender, nondistended  Ext: non-tender, no edema  :  1) External  Genitalia -  Normal appearing skin and hair distribution, no visible lesions.   2) Vagina:  mucosa appears pink, and well rugated.   3) Cervix:  smooth, with no visible lesions, erosions, or scars, os closed. No bleeding noted.   4) Uterus: Anteverted. Mobile.   5) Adnexa/parametria:  Non tender adnexa. No masses.     Fetal Well Being Assessment:     BPM    Assessment/Plan:     Luba Lane is a 33 year old  female at 12w3d who presents today to establish prenatal care.    Patient Active Problem List    Diagnosis    History of pre-eclampsia     Low dose ASA  [ ] baseline labs   [ ] PCR      History of gestational diabetes     [ ] early 1 hr GTT      Antepartum multigravida of advanced maternal age (HCC)     Low dose ASA  [ ] MFM w/ level 2 US  [ ] growth   [ ] NST         Obesity affecting pregnancy, antepartum (HCC)     [ ] early 1 hr GTT  [ ] MFM       Gestational diabetes mellitus (GDM), antepartum (HCC)    Anemia during pregnancy (HCC)    Cervical high risk human papillomavirus (HPV) DNA test positive     Neg 16/18/45  Repeat cotesting in 1 year ()           Prenatal care  - prenatal labs ordered  - Pap:  collected and ordered   - Cervical cultures: GC, Chl  collected and ordered   - continue PNV daily given        Genetic Screening tests -->[id number] ordered  - Discussion held with patient about genetic screening. Discussion including first trimester versus second trimester screening. Patient offered Amniocentesis and  declined. Pt considering first or second trimester screening. Advised to call the office if she would like to pursue first trimester screening as recommended to complete at 13wks GA    Genetic Carrier screening  Discussed CF, hemoglobinopathies and SMA screening in the patient. The patient and her partner are not of Ashkenazi [place], Syriac [place] or Cajun descent and have no family history of Ravi Sach's Disease; thus they are not candidates for screening of Ravi  Delvin's.       Patient education  - Pt counseled on safety, diet, exercise, caffiene, tobacco, ETOH, sexual activity, ER precautions, diet, exercise, appropriate weight gain, travel, appropriate otc medication use, substance abuse and pets.  - Counseled on taking a PNV with 0.4mg of folic acid   - Limiting intake of alcohol, coffee, tea, soda, and other foods containing caffeine  - Limit intake of fish to twice weekly to limit mercury exposure  - COVID 19 precautions provided    - Pregnancy BMI guidelines: Prepregnancy BMI 35   <18.5 = underweight = 28-40 lbs gain = 1 lb/wk (2nd/3rd tri)   18.5 - 24.9 = normal = 25 - 35 lbs gain = 1 lb/wk   25 - 29.9 = overweight = 15 - 25 lbs gain = 0.6 lb/wk   > 30 = obese = 11 - 20 lbs gain = 0.5 lb/wk     Patient is a candidate for aspirin. Aspirin should be initiated between 12 to 28 weeks, but ideally before 16 weeks. Risk factors include: hx of preeclampsia        Patient is a candidate for an early glucola screen. We discussed that we Consider testing in all women who are overweight or obese (ie, have a body mass index greater than 25 or greater than 23 in  Americans) and have additional risk factor. Her additional risk factor is Hx of GDM      Heron Marquez MD      Discussed with patient that there will not be further notification of normal or benign results other than receiving results on [employer]. A [employer] message or telephone call will be placed by the physician and/or office staff if results are abnormal.     Note to patient and family   The 21st Century Cures Act makes medical notes available to patients in the interest of transparency.  However, please be advised that this is a medical document.  It is intended as zbkc-vu-tmcc communication.  It is written and medical language may contain abbreviations or verbiage that are technical and unfamiliar.  It may appear blunt or direct.  Medical documents are intended to carry relevant information, facts as evident, and  the clinical opinion of the practitioner.

## 2024-03-19 NOTE — TELEPHONE ENCOUNTER
From: Luba Lane  To: Heron Marquez  Sent: 3/18/2024 2:30 PM CDT  Subject: Chewable Aspirin Question    I’m attaching some photos of the low dose aspirin I’ve been taking.

## 2024-03-20 LAB
C TRACH DNA SPEC QL NAA+PROBE: NEGATIVE
N GONORRHOEA DNA SPEC QL NAA+PROBE: NEGATIVE

## 2024-03-21 LAB
HPV I/H RISK 1 DNA SPEC QL NAA+PROBE: POSITIVE
HPV16 DNA CVX QL PROBE+SIG AMP: NEGATIVE
HPV18 DNA CVX QL PROBE+SIG AMP: NEGATIVE

## 2024-03-22 ENCOUNTER — LAB ENCOUNTER (OUTPATIENT)
Dept: LAB | Facility: HOSPITAL | Age: 34
End: 2024-03-22
Attending: STUDENT IN AN ORGANIZED HEALTH CARE EDUCATION/TRAINING PROGRAM
Payer: COMMERCIAL

## 2024-03-22 DIAGNOSIS — Z3A.12 12 WEEKS GESTATION OF PREGNANCY (HCC): ICD-10-CM

## 2024-03-22 LAB
ALBUMIN SERPL-MCNC: 3.2 G/DL (ref 3.4–5)
ALBUMIN/GLOB SERPL: 0.8 {RATIO} (ref 1–2)
ALP LIVER SERPL-CCNC: 49 U/L
ALT SERPL-CCNC: 14 U/L
ANION GAP SERPL CALC-SCNC: 5 MMOL/L (ref 0–18)
ANTIBODY SCREEN: NEGATIVE
AST SERPL-CCNC: 7 U/L (ref 15–37)
BASOPHILS # BLD AUTO: 0.03 X10(3) UL (ref 0–0.2)
BASOPHILS NFR BLD AUTO: 0.4 %
BILIRUB SERPL-MCNC: 0.2 MG/DL (ref 0.1–2)
BUN BLD-MCNC: 8 MG/DL (ref 9–23)
CALCIUM BLD-MCNC: 9.6 MG/DL (ref 8.5–10.1)
CHLORIDE SERPL-SCNC: 107 MMOL/L (ref 98–112)
CO2 SERPL-SCNC: 25 MMOL/L (ref 21–32)
CREAT BLD-MCNC: 0.9 MG/DL
CREAT UR-SCNC: 127 MG/DL
EGFRCR SERPLBLD CKD-EPI 2021: 85 ML/MIN/1.73M2 (ref 60–?)
EOSINOPHIL # BLD AUTO: 0.12 X10(3) UL (ref 0–0.7)
EOSINOPHIL NFR BLD AUTO: 1.4 %
ERYTHROCYTE [DISTWIDTH] IN BLOOD BY AUTOMATED COUNT: 12.9 %
FASTING STATUS PATIENT QL REPORTED: NO
GLOBULIN PLAS-MCNC: 4.1 G/DL (ref 2.8–4.4)
GLUCOSE BLD-MCNC: 101 MG/DL (ref 70–99)
HBV SURFACE AG SER-ACNC: <0.1 [IU]/L
HBV SURFACE AG SERPL QL IA: NONREACTIVE
HCT VFR BLD AUTO: 36 %
HCV AB SERPL QL IA: NONREACTIVE
HGB BLD-MCNC: 12.1 G/DL
IMM GRANULOCYTES # BLD AUTO: 0.06 X10(3) UL (ref 0–1)
IMM GRANULOCYTES NFR BLD: 0.7 %
LYMPHOCYTES # BLD AUTO: 1.82 X10(3) UL (ref 1–4)
LYMPHOCYTES NFR BLD AUTO: 21.3 %
MCH RBC QN AUTO: 26.9 PG (ref 26–34)
MCHC RBC AUTO-ENTMCNC: 33.6 G/DL (ref 31–37)
MCV RBC AUTO: 80 FL
MONOCYTES # BLD AUTO: 0.47 X10(3) UL (ref 0.1–1)
MONOCYTES NFR BLD AUTO: 5.5 %
NEUTROPHILS # BLD AUTO: 6.05 X10 (3) UL (ref 1.5–7.7)
NEUTROPHILS # BLD AUTO: 6.05 X10(3) UL (ref 1.5–7.7)
NEUTROPHILS NFR BLD AUTO: 70.7 %
OSMOLALITY SERPL CALC.SUM OF ELEC: 282 MOSM/KG (ref 275–295)
PLATELET # BLD AUTO: 286 10(3)UL (ref 150–450)
POTASSIUM SERPL-SCNC: 3.7 MMOL/L (ref 3.5–5.1)
PROT SERPL-MCNC: 7.3 G/DL (ref 6.4–8.2)
PROT UR-MCNC: <5 MG/DL
RBC # BLD AUTO: 4.5 X10(6)UL
RH BLOOD TYPE: POSITIVE
RUBV IGG SER QL: POSITIVE
RUBV IGG SER-ACNC: 99.9 IU/ML (ref 10–?)
SODIUM SERPL-SCNC: 137 MMOL/L (ref 136–145)
T PALLIDUM AB SER QL IA: NONREACTIVE
WBC # BLD AUTO: 8.6 X10(3) UL (ref 4–11)

## 2024-03-25 ENCOUNTER — LAB ENCOUNTER (OUTPATIENT)
Dept: LAB | Facility: HOSPITAL | Age: 34
End: 2024-03-25
Attending: STUDENT IN AN ORGANIZED HEALTH CARE EDUCATION/TRAINING PROGRAM
Payer: COMMERCIAL

## 2024-03-25 DIAGNOSIS — Z3A.12 12 WEEKS GESTATION OF PREGNANCY (HCC): ICD-10-CM

## 2024-03-25 DIAGNOSIS — E74.39 GLUCOSE INTOLERANCE: Primary | ICD-10-CM

## 2024-03-25 LAB
.: NORMAL
.: NORMAL
GLUCOSE 1H P GLC SERPL-MCNC: 175 MG/DL
VZV IGG SER IA-ACNC: 1035 (ref 165–?)

## 2024-03-25 PROCEDURE — 36415 COLL VENOUS BLD VENIPUNCTURE: CPT

## 2024-03-25 PROCEDURE — 82950 GLUCOSE TEST: CPT

## 2024-03-27 LAB
11Q23 DELETION (JACOBSEN): NOT DETECTED
15Q11 DELETION (PW ANGELMAN): NOT DETECTED
1P36 DELETION SYNDROME: NOT DETECTED
22Q11 DELETION (DIGEORGE): NOT DETECTED
4P16 DELETION(WOLF-HIRSCHHORN): NOT DETECTED
5P15 DELETION (CRI-DU-CHAT): NOT DETECTED
8Q24 DELETION (LANGER-GIEDION): NOT DETECTED
GESTATIONAL AGE > OR = 9W:: YES
MONOSOMY X (TURNER SYNDROME): NOT DETECTED
TEST RESULT: NEGATIVE
TRISOMY 13 (PATAU SYNDROME): NEGATIVE
TRISOMY 16: NOT DETECTED
TRISOMY 18 (EDWARDS SYNDROME): NEGATIVE
TRISOMY 21 (DOWN SYNDROME): NEGATIVE
TRISOMY 22: NOT DETECTED
XXX (TRIPLE X SYNDROME): NOT DETECTED
XXY (KLINEFELTER SYNDROME): NOT DETECTED
XYY (JACOBS SYNDROME): NOT DETECTED

## 2024-04-14 ENCOUNTER — LAB ENCOUNTER (OUTPATIENT)
Dept: LAB | Facility: HOSPITAL | Age: 34
End: 2024-04-14
Attending: STUDENT IN AN ORGANIZED HEALTH CARE EDUCATION/TRAINING PROGRAM
Payer: COMMERCIAL

## 2024-04-14 DIAGNOSIS — E74.39 GLUCOSE INTOLERANCE: ICD-10-CM

## 2024-04-14 LAB
GLUCOSE 1H P GLC SERPL-MCNC: 202 MG/DL
GLUCOSE 2H P GLC SERPL-MCNC: 151 MG/DL
GLUCOSE 3H P GLC SERPL-MCNC: 156 MG/DL (ref 70–140)
GLUCOSE P FAST SERPL-MCNC: 110 MG/DL

## 2024-04-14 PROCEDURE — 82952 GTT-ADDED SAMPLES: CPT

## 2024-04-14 PROCEDURE — 36415 COLL VENOUS BLD VENIPUNCTURE: CPT

## 2024-04-14 PROCEDURE — 82951 GLUCOSE TOLERANCE TEST (GTT): CPT

## 2024-04-18 DIAGNOSIS — O24.419 GESTATIONAL DIABETES MELLITUS (GDM), ANTEPARTUM, GESTATIONAL DIABETES METHOD OF CONTROL UNSPECIFIED (HCC): Primary | ICD-10-CM

## 2024-04-22 ENCOUNTER — ROUTINE PRENATAL (OUTPATIENT)
Dept: OBGYN CLINIC | Facility: CLINIC | Age: 34
End: 2024-04-22
Payer: COMMERCIAL

## 2024-04-22 VITALS
BODY MASS INDEX: 36 KG/M2 | SYSTOLIC BLOOD PRESSURE: 120 MMHG | DIASTOLIC BLOOD PRESSURE: 80 MMHG | HEART RATE: 107 BPM | WEIGHT: 210 LBS

## 2024-04-22 DIAGNOSIS — O09.529 ANTEPARTUM MULTIGRAVIDA OF ADVANCED MATERNAL AGE (HCC): ICD-10-CM

## 2024-04-22 DIAGNOSIS — Z34.80 SUPERVISION OF OTHER NORMAL PREGNANCY, ANTEPARTUM (HCC): Primary | ICD-10-CM

## 2024-04-22 DIAGNOSIS — Z87.59 HISTORY OF PRE-ECLAMPSIA: ICD-10-CM

## 2024-04-22 DIAGNOSIS — Z86.32 HISTORY OF GESTATIONAL DIABETES: ICD-10-CM

## 2024-04-22 DIAGNOSIS — O99.210 OBESITY AFFECTING PREGNANCY, ANTEPARTUM, UNSPECIFIED OBESITY TYPE (HCC): ICD-10-CM

## 2024-04-22 DIAGNOSIS — R87.810 CERVICAL HIGH RISK HUMAN PAPILLOMAVIRUS (HPV) DNA TEST POSITIVE: ICD-10-CM

## 2024-04-22 DIAGNOSIS — O24.410 DIET CONTROLLED GESTATIONAL DIABETES MELLITUS (GDM), ANTEPARTUM (HCC): ICD-10-CM

## 2024-04-22 NOTE — PROGRESS NOTES
HARPREET    GA 17w4d  Vitals:    24 1326   BP: 120/80   Pulse: 107   Weight: 210 lb (95.3 kg)       Doing well. No complaints.   Denies abdominal/pelvic pain or vaginal bleeding.   Rh +   Genetic screening NIPT neg. D/w patient MSAP and will call if desires order.   Recommend Anatomy scan 20 wks  by level 2 US  Prenatal labs reviewed       Assessment:     Luba Lane is a 33 year old  at 17w4d who presents for routine OB visit. Overall doing well.     Problem List Items Addressed This Visit          Endocrine and Metabolic    Gestational diabetes mellitus (GDM), antepartum ([facility])    History of gestational diabetes       Genitourinary and Reproductive    Cervical high risk human papillomavirus (HPV) DNA test positive       Gravid and     History of pre-eclampsia    Antepartum multigravida of advanced maternal age (HCC)    Obesity affecting pregnancy, antepartum ([facility])    Supervision of other normal pregnancy, antepartum ([facility]) - Primary           Plan:     Patient Active Problem List    Diagnosis    Supervision of other normal pregnancy, antepartum ([facility])    History of pre-eclampsia     Low dose ASA  [ x] baseline labs   [ x] PCR      History of gestational diabetes     [x ] early 1 hr GTT abnormal  [ X] 3 hr GTT abnormal      Antepartum multigravida of advanced maternal age (HCC)     Low dose ASA  [ ] MFM w/ level 2 US  [ ] growth   [ ] NST         Obesity affecting pregnancy, antepartum ([facility])     [ x] early 1 hr GTT - abnl  [ ] MFM       Gestational diabetes mellitus (GDM), antepartum ([facility])     [ ] diabetic education   [ ] glucose QID      Cervical high risk human papillomavirus (HPV) DNA test positive     HPV + but Neg 16/18/45, NILM   Repeat cotesting in 1 year: NILM, HPV+ but neg 16/18/45  [ ] colposcopy          - continue routine prenatal care   - labor and rupture of membrane precautions provided    Return to clinic in 4 weeks for HARPREET visit         Note to patient and  family   The 21st Century Cures Act makes medical notes available to patients in the interest of transparency.  However, please be advised that this is a medical document.  It is intended as hpdl-[id number]-vtkh communication.  It is written and medical language may contain abbreviations or verbiage that are technical and unfamiliar.  It may appear blunt or direct.  Medical documents are intended to carry relevant information, facts as evident, and the clinical opinion of the practitioner.

## 2024-04-22 NOTE — PATIENT INSTRUCTIONS
Maternal Serum Alpha-fetoprotein (MSAFP)   - Performed at 16-20 weeks includes blood test    - Screens for Neural Tube Defects (abnormal closure of fetal spine)  - Please verify insurance coverage:   CPT code: 54569  Diagnosis code: Genetic screening - Z36.8A          Medications Safe in Pregnancy  The following over-the-counter medications may be taken safely after 12 weeks gestation by any patient who is pregnant.  Please follow the instructions on the package for adult dosage.  If you experience any symptoms prior to 12 weeks, please call the office at [phone].      Colds/Congestion: Flonase, Saline Nasal Spray, Neti Pot, Plain Robitussin, Robitussin DM, Mucinex DM, Vicks 44 E, Vicks Vapor rub, Cough drops without Zinc or Sudafed.   Contact your doctor if you have a persistent fever over 100.4, shortness of breath, coughing up green mucus, or a sore throat that persists from more than 3 days    Diarrhea: Imodium, Maalox Anti-Diarrheal or Kaopectate  Contact the office if you have diarrhea for more than 3 days.    Allergies: Benadryl, Claritin or Zyrtec    Hemorrhoids: Tucks pads, Preparation H, Annusol, Sitz bath or Witch hazel  Eat a high fiber diet and drink plenty of fluids.    Yeast: Monistat 3 or 7  Call if your symptoms do not improve, or if you experience vaginal bleeding, or a watery discharge.    Constipation: Metamucil, Colace, fiber supplement, Milk of Magnesia or Dulcolax  Drink plenty of fluids, eat high-fiber foods and take the above laxatives sporadically.     Headache or Mild aches and pain: Extra Strength Tylenol     Gas: Gas X, Gelusil, Papaya Tablets, Maalox, Mylicon or Mylanta Gas    Heartburn: Tums, Mylanta, Pepcid AC or Maalox    Sore throat: Alcohol free Chloraseptic spray or Lozenges     Nausea and Vomiting: ½ tablet Unisom plus 100mg of Vitamin B6 at bedtime (may increase B6 up to 200mg per day), Alejandra root tea or raspberry leaf tea    Insomnia: Tylenol PM, Vitamin B6 50mg, warm  bath or milk, Unisom, Nytol or Sominex.     We have listed a few medications for common symptoms seen in pregnancy.  Please contact the office if you are unsure about any over the counter medications that are not listed above.

## 2024-04-30 ENCOUNTER — HOSPITAL ENCOUNTER (OUTPATIENT)
Dept: ENDOCRINOLOGY | Facility: HOSPITAL | Age: 34
Discharge: HOME | End: 2024-04-30
Attending: OBSTETRICS & GYNECOLOGY
Payer: COMMERCIAL

## 2024-04-30 VITALS — WEIGHT: 210 LBS | BODY MASS INDEX: 36 KG/M2

## 2024-04-30 DIAGNOSIS — O24.410 DIET CONTROLLED GESTATIONAL DIABETES MELLITUS (GDM), ANTEPARTUM (HCC): Primary | ICD-10-CM

## 2024-04-30 DIAGNOSIS — O24.419 GESTATIONAL DIABETES MELLITUS (GDM), ANTEPARTUM, GESTATIONAL DIABETES METHOD OF CONTROL UNSPECIFIED (HCC): Primary | ICD-10-CM

## 2024-04-30 DIAGNOSIS — O24.419 GESTATIONAL DIABETES MELLITUS (GDM), ANTEPARTUM, GESTATIONAL DIABETES METHOD OF CONTROL UNSPECIFIED (HCC): ICD-10-CM

## 2024-04-30 RX ORDER — BLOOD-GLUCOSE METER
1 EACH MISCELLANEOUS DAILY
Qty: 1 KIT | Refills: 0 | Status: SHIPPED | OUTPATIENT
Start: 2024-04-30 | End: 2024-08-01

## 2024-04-30 RX ORDER — LANCETS
1 EACH MISCELLANEOUS 4 TIMES DAILY
Qty: 200 EACH | Refills: 3 | Status: SHIPPED | OUTPATIENT
Start: 2024-04-30

## 2024-04-30 NOTE — PROGRESS NOTES
Luba Lane  : 1990 was seen for Gestational Diabetes Counseling: Individual  Realtime audio-video visit. .  Pt verbally consents to this audio-video visit.      Date: 2024   Start time: 1300 End time: 1400    Obtained usual diet history:   3 meals and 2 snacks per day  B: breakfast sandwich, cereal, coffee  Snack: yogurt with granola, string cheese  L: chicken pot pie, sandwich  Snack: small  D: soup with ground beef, grilled burgers, fajitas, fish    Education:     GDM Overview:  Reviewed gestational diabetes as diagnosis including target blood glucose values.  Benefits, risks, and management options for improving/maintaining glucose control to mother/baby discussed.    Instructed /demonstrated ability to perform blood glucose testing on: Cont[employer] Next  Discussed monitoring ketones.    Healthy Eating:  Discussed nutrition concepts for pregnancy/healthy eating and effects of food on BG value.  Timing of meals; what is a carbohydrate, protein, fat.  Taught: Carb counting, label reading, meal planning.  Suggested Calorie Level:  9206-8436    Being Active:  Benefits and effects of activity on BG discussed.     Monitoring:  Instructed on how to use glucose monitor/proper lancet disposal. Testing schedules are:   Fastin-95 mg/dL, Call MD if >95 mg/dL twice in 1 week   2 Hour Post Prandial:  120 mg/dL, Call MD if >120 mg/dL twice in 1 week.    Taking Medication:  Reviewed when medication might be indicated.    Reducing Risk:  Effects of elevated blood glucose on mother/baby reviewed.  Post pregnancy management/prevention of Type 2 DM, and increased risk of having diabetes later in life reviewed.    Healthy Coping:  Family involvement/social support encouraged.  Identification of lifestyle behaviors willing to change discussed.    Training Tools Provided:  Printed materials covering each topic provided.  Support Plan provided and reviewed.    Patient Chosen Goals:      1. Follow  recommended GDM meal plan.   2. Begin testing fasting glucose and 2 hours after meals   3. Please send records to your current ob/gyn or midwife within 3 days - 1 week following this visit                  4. Encouraged activity if no restrictions.   5. Encouraged Luba Lane to call diabetes center with any questions or concerns.    Patient verbalized understanding and has no further questions at this time.    Chloe Winston RN

## 2024-05-10 ENCOUNTER — OFFICE VISIT (OUTPATIENT)
Dept: PERINATAL CARE | Facility: HOSPITAL | Age: 34
End: 2024-05-10
Attending: STUDENT IN AN ORGANIZED HEALTH CARE EDUCATION/TRAINING PROGRAM
Payer: COMMERCIAL

## 2024-05-10 ENCOUNTER — ULTRASOUND ENCOUNTER (OUTPATIENT)
Dept: PERINATAL CARE | Facility: HOSPITAL | Age: 34
End: 2024-05-10
Attending: OBSTETRICS & GYNECOLOGY
Payer: COMMERCIAL

## 2024-05-10 VITALS
HEART RATE: 89 BPM | SYSTOLIC BLOOD PRESSURE: 132 MMHG | DIASTOLIC BLOOD PRESSURE: 82 MMHG | BODY MASS INDEX: 35.51 KG/M2 | WEIGHT: 208 LBS | HEIGHT: 64 IN

## 2024-05-10 DIAGNOSIS — O24.419 GESTATIONAL DIABETES MELLITUS (GDM), ANTEPARTUM, GESTATIONAL DIABETES METHOD OF CONTROL UNSPECIFIED (HCC): ICD-10-CM

## 2024-05-10 DIAGNOSIS — O99.210 OBESITY AFFECTING PREGNANCY, ANTEPARTUM, UNSPECIFIED OBESITY TYPE (HCC): ICD-10-CM

## 2024-05-10 DIAGNOSIS — Z87.59 HISTORY OF PRE-ECLAMPSIA: ICD-10-CM

## 2024-05-10 DIAGNOSIS — O09.529 ANTEPARTUM MULTIGRAVIDA OF ADVANCED MATERNAL AGE (HCC): ICD-10-CM

## 2024-05-10 DIAGNOSIS — O09.529 ANTEPARTUM MULTIGRAVIDA OF ADVANCED MATERNAL AGE (HCC): Primary | ICD-10-CM

## 2024-05-10 PROCEDURE — 76811 OB US DETAILED SNGL FETUS: CPT | Performed by: OBSTETRICS & GYNECOLOGY

## 2024-05-10 RX ORDER — FAMOTIDINE 10 MG
10 TABLET ORAL 2 TIMES DAILY
COMMUNITY

## 2024-05-10 NOTE — PROGRESS NOTES
Outpatient Maternal-Fetal Medicine Consultation    Dear Dr. Marquez,    Thank you for requesting ultrasound evaluation and maternal fetal medicine consultation on your patient Luba Lane.  As you are aware she is a 33 year old female with a Cunningham pregnancy at 20w1d.  A maternal-fetal medicine consultation was requested secondary to advanced maternal age, class II obesity, and gestational diabetes diagnosed in the second trimester.  Her prenatal records and labs were reviewed.    She had preeclampsia & GDM A1 in her prior pregnancy.    HISTORY  OB History    Para Term  AB Living   3 1 1 0 1 1   SAB IAB Ectopic Multiple Live Births   0 0 0 0 1     # 1 - Date: , Sex: None, Weight: None, GA: 7w0d, Type: None, Apgar1: None, Apgar5: None, Living: None, Birth Comments: None    # 2 - Date: 20, Sex: Male, Weight: 7 lb 11.5 oz (3.5 kg), GA: 37w2d, Type: Normal spontaneous vaginal delivery, Apgar1: 9, Apgar5: 9, Living: Living, Birth Comments: None    # 3 - Date: None, Sex: None, Weight: None, GA: None, Type: None, Apgar1: None, Apgar5: None, Living: None, Birth Comments: None    Past Medical History  The patient  has a past medical history of Anemia, Gestational diabetes (HCC), and Pregnancy-induced hypertension (HCC).    Past Surgical History  The patient  has a past surgical history that includes other (2017).    Family History  The patient She indicated that her mother is alive. She indicated that her father is alive.      Medications:   Current Outpatient Medications:     famotidine 10 MG Oral Tab, Take 1 tablet (10 mg total) by mouth 2 (two) times daily., Disp: , Rfl:     aspirin 81 MG Oral Tab EC, Take 1 tablet (81 mg total) by mouth daily., Disp: , Rfl:     Prenatal 27-0.8 MG Oral Tab, Take 1 tablet by mouth daily., Disp: , Rfl:     Glucose Blood (CONTOUR NEXT TEST) In Vitro Strip, 1 each by Other route 4 (four) times daily. Please test blood sugars 4x per day. Test in the  morning and 2 hours after each meal., Disp: 120 strip, Rfl: 4    Blood Glucose Monitoring Suppl (CONTOUR NEXT ONE) Does not apply Kit, 1 kit by Finger stick route daily. Test sugar 4x daily. Fasting and 2 hours after each meal., Disp: 1 kit, Rfl: 0    Microlet Lancets Does not apply Misc, Inject 1 Lancet into the skin 4 (four) times daily., Disp: 200 each, Rfl: 3    Ferrous Sulfate (IRON OR), Take by mouth. (Patient not taking: Reported on 4/30/2024), Disp: , Rfl:   Allergies: No Known Allergies      PHYSICAL EXAMINATION:  /82 (BP Location: Right arm, Patient Position: Sitting, Cuff Size: adult)   Pulse 89   Ht 5' 4\" (1.626 m)   Wt 208 lb (94.3 kg)   LMP 12/21/2023 (Exact Date)   BMI 35.70 kg/m²   General: alert and oriented,no acute distress  Abdomen: gravid, soft, non-tender  Extremities: non-tender, no edema        OBSTETRIC ULTRASOUND  The patient had a level 2 ultrasound today which I interpreted the results and reviewed them with the patient.    Ultrasound Findings:  Single IUP in cephalic presentation.    Placenta is posterior, fundal.   A 3 vessel cord is noted.  Cardiac activity is present at 139 bpm   g ( 0 lb 11 oz);   MVP is 5.4 cm .     The fetal measurements are consistent with the established EDC. No ultrasound evidence of structural abnormalities are seen today. The nasal bone is present. No ultrasound evidence of markers for aneuploidy are seen. She understands that ultrasound exam cannot exclude genetic abnormalities and that genetic testing is recommended. The limitations of ultrasound were discussed.     Uterus and adnexa appeared normal  today on US    See imaging tab for the complete US report.    DISCUSSION  During her visit we discussed and reviewed the following issues:  GESTATIONAL DIABETES    3 hour GTT  Lab Results   Component Value Date    GLUFG 110 (H) 04/14/2024    GLU1G 202 (H) 04/14/2024    GLU2G 151 04/14/2024    GLU3G 156 (H) 04/14/2024       The patient was  informed of the potential implications and risks associated with GDM to her and her fetus, especially when poorly controlled. We discussed the increased incidence of macrosomia and the potential for related birth injury to her and her baby. We talked about the increase risks associated risk of fetal hyperinsulinemia, jaundice, electrolyte imbalance, seizure activity, IUFD and other adverse obstetric outcomes. We also reviewed her  increased risk of having diabetes later in life. The importance of good glycemic control and avoidance of prolonged hypoglycemia and hyperglycemia was addressed.    She was instructed on the diabetic diet 24; her diet was modified to provide three meals and three snacks with fewer carbohydrates.  She received instruction on self-monitoring of blood glucose.  She was advised to assess  her blood sugars four times daily (fasting and 2 hour postprandially).   Fasting blood glucose should be less than 95 mg/dl and two hour postprandial values should be less than 120 mg/dl.   She was also  Advised to send her capillary blood glucose records to our office for weekly MFM review. Insulin therapy may be started depending on her blood sugar levels.    Her capillary blood glucose records were not reviewed today as she has only been testing a few days and did not bring her log; her compliance with the recommended four times daily assessments (fasting and two-hour post-prandial) is  good .   Her overall glucose control is  reportedly good .    The patient is presently on diet therapy; her compliance with her diabetic diet regimen was reviewed and it is good.     We compared and contrasted insulin (long and short acting) and metformin to manage blood sugars in pregnancy.  We discussed insulin as the gold standard therapy in pregnancy and that it does not cross to the fetal circulation.  Metformin is increasingly being used in pregnancy but the long term data on / childhood effects are  lacking.  Metformin crosses the placenta and  levels are >70% of the maternal level at delivery.  Metformin is not associated with increased rates for NICU admission,  hypoglycemia or LGA when its use controls the blood sugars to the recommended targets.  There is emerging information,however, that there is an increase in childhood obesity, and possibly metabolic syndrome in children exposed to Metformin throughout the entire pregnancy.  Currently, use in the third trimester for management of gestational diabetes is within the standard of care.     Based on the above discussion, should the need for improved glycemic control arise, Luba would prefer to start on insulin.      Medical Regimen Recommendation:   Continue ADA diet & monitoring with her OB and dietician      ADVANCED MATERNAL AGE    Background  I reviewed with the patient that pregnancies in women of advanced maternal age (35 or older at delivery) are associated with elevated risks. Specifically, there is a higher rate of:  Fetal malformations  Preeclampsia  Gestational diabetes  Intrauterine fetal death    As a result, enhanced pregnancy surveillance is advised for these patients including a comprehensive ultrasound to assess for fetal malformations (at 20 weeks) and a third trimester ultrasound assessment for fetal growth (at 32 weeks). In addition, weekly NST's (initiating at 36 weeks gestation for women 35-39 years and at 32 weeks gestation for women 40 years and older) are also advised. Routine obstetric care is more than adequate to assess for gestational diabetes and preeclampsia; hence, no further significant alterations in obstetric care are advised.    Medical Complications    Women 35 years of age or older can expect to experience two to three fold higher rates of hospitalization,  delivery, and pregnancy-related complications when compared to their younger counterparts.  The two most common medical problems  complicating these  pregnanccies are hypertension and diabetes.   The incidence of preeclampsia in the general obstetric population is 3 to 4 percent; this increases to 5 to 10 percent in women over age 40 and is as high as 35 percent in women over age 50.   The incidence of gestational diabetes in the general obstetric population is 3 percent, rising to 7 to 12 percent in women over age 40 and 20 percent in women over age 50.  Women 35 years of age or older are more likely to be delivered by . The  delivery rate in the general obstetric population of the [place] is almost 30 percent, compared to almost 50 percent in women over age 40 to 45 and almost 80 percent in women age 50 to 63.          Fetal Death        A decision analysis tool using data from the [place] [employer]  Database predicted a strategy of weekly antepartum testing and labor induction would lower the risk of unexplained fetal death in women 35 years of age or older. In this model, weekly testing starting at 36 weeks of gestation would drop the risk of fetal death from 5.2 to 1.3 per 1000 pregnancies. While a policy of antepartum testing in older women does increase the chance that a women will be induced (71 inductions per fetal death averted) and thereby increases her risk of having a  delivery, only 14 additional cesareans would need to be performed to avert one unexplained fetal death.  Hence, weekly NST's are advised for women of advanced maternal age; testing should be initiated at 36 weeks for women 35-39 years and at 32 weeks for women 40 years and older.    Fetal Malformations    Cardiac malformations, clubfoot, and diaphragmatic hernia appear to occur with increased frequency in offspring of older women. These abnormalities are structural and unrelated to aneuploidy, thus they would not be detected by karyotype analysis.  For these reasons a complete, detailed ultrasound (level II) is advised even if  the fetus has a normal karyotype.      Fetal Aneuploidy  We also discussed the increased risk of chromosomal abnormalities associated with advanced maternal age. I reviewed that an ultrasound examination cannot reliably exclude potential genetic abnormalities. Given that the patient will be 34 years old at the time of delivery I reviewed that her risk (at amniocentesis) of having a fetus with any chromosome abnormality is 1:100 and with trisomy 21 is 1: 190.    Invasive Testing  I offered invasive genetic testing (amniocentesis, chorionic villus sampling) after reviewing the diagnostic accuracy of these tests as well as the procedure associated loss rate (1:500 for genetic amniocentesis).    She ultimately does not desire invasive genetic testing.     Non-invasive Pregnancy Testing (NIPT) -we reviewed her low risk cell free DNA screen and the role for level 2 ultrasound  We discussed her low risk cell free DNA screen and her normal level II ultrasound today.  We discussed her option for amniocentesis but that the likelihood of finding a genetic concern is quite low after her other low risk evaluations.  She appropriately declined invasive prenatal genetic diagnostic testing.      OBESITY:  Her BMI prior to pregnancy was 35.3  Obesity during pregnancy is associated with numerous maternal and  risks.  It is not clear whether obesity is a direct cause of adverse pregnancy outcome or whether the association between obesity and adverse pregnancy outcome is due to factors such as diabetes mellitus.   Data suggest that obese women should be encouraged to undertake a weight reduction program (diet, exercise, behavior modification, and possibly bariatric surgery in some cases) prior to attempting to conceive.            Subfertility in obese women is most commonly related to ovulatory dysfunction, and, in some obese women, the ovulatory dysfunction is related to polycystic ovary syndrome (PCOS). It is also important  to note that even among ovulatory women, increasing obesity is associated with decreasing spontaneous pregnancy rates.  The increased risk of miscarriage in obese women may be because such women often have PCOS or isolated insulin resistance.                 Due to its strong association with obesity in the general population, type 2 diabetes mellitus is one of the two most common medical complications of the obese . The increased risk of type 2 diabetes is primarily related to an exaggerated increase in insulin resistance in the obese state. It is reasonable to screen obese gravidas for undiagnosed pregestational diabetes in the first trimester.   Glucose intolerance associated with gestational diabetes generally resolves postpartum; however, obese women with a history of gestational diabetes have a two-fold increased prevalence of subsequent type 2 diabetes.           An association between obesity and hypertensive disorders during pregnancy has been consistently reported.  In particular, maternal weight and BMI are independent risk factors for preeclampsia.             Studies have found that the increased risk of  birth in obese gravidas is primarily associated with obesity-related medical and  complications, rather than an intrinsic predisposition to spontaneous  birth. Prevention of  birth in these patients, therefore, should be directed toward prevention or management of medical and obstetrical complications.               Both prepregnancy obesity and excessive maternal weight gain before or during pregnancy contribute to an increased probability of  delivery.  It has also been hypothesized that obesity may lead to dystocia due to increased soft tissue deposition in the maternal pelvis.    delivery in the obese  is associated with numerous perioperative concerns, including emergency delivery, prolonged incision to delivery interval, blood loss >1000  mL, longer operative times, wound infection, thromboembolism, and endometritis.            Maternal obesity appears to be associated with a small increase in the absolute rate of some congenital anomalies (primarily neural tube defect and cardiac), and the risk may increase with increasing maternal weight.  Level II ultrasound is advised for women with obesity.  The risk of neural tube defects increased significantly with maternal weight.    The analysis found that overweight and obese pregnant women experienced significantly more stillbirths than normal weight women.  Third trimester  testing is advised.  We discussed the current recommendations for limited gestational weight gain in pregnancy for overweight and obese women.  The [employer] currently recommends that women keep gestational weight gain to between 8-18 lbs.  We discussed the role of mild to moderate exercise, healthy food choices and appropriate portions sized to help achieve this goal.  Excess weight gain is associated with higher rates of gestational diabetes, hypertensive complications, fetal macrosomia and delivery complications.  Women with weight loss or insufficient weight gain have higher rates of small for gestational age infants.    A recent study found that initiating moderate exercise in early pregnancy for obese gravidas significantly reduced the incidence of gestational diabetes, gestational hypertension,  deliveries and C-sections.  In the study, women were assigned to riding a stationary cycle for  30 minutes 3 times per week, keeping HR<140 bpm.  I encouraged Luba to begin moderate exercise such as walking or stationary bike in the pregnancy.    PRIOR PREECLAMPSIA  History:  The patient developed preeclampsia at 36 weeks and was induced at 37 weeks.  She was put on magnesium sulfate but did not need to go home on antihypertensive medication.     Background  Preeclampsia refers to the new onset of  hypertension and proteinuria after 20 weeks of gestation in a previously normotensive woman.  Preeclampsia occurs in approximately 3 to 14 percent of all pregnancies worldwide, and about 5 to 8 percent in the [place].  The pathogenesis of preeclampsia is incompletely understood, but the disorder is clearly initiated by the presence of the trophoblast, and impaired placental angiogenesis plays an important role.   The clinical manifestations of preeclampsia can appear anytime from the second trimester to the first few weeks postpartum. The majority of cases of preeclampsia arise in low-risk nulliparous women without medical complications or identifiable risk factors.        Recurrence   A 2015 meta-analysis of individual patient data from over 75,000 women with preeclampsia who became pregnant again found that 16 percent developed recurrent preeclampsia and 20 percent developed hypertension alone in a subsequent pregnancy.  The recurrence risk varies with the severity and time of onset of the initial episode. Women with early-onset, severe preeclampsia are at greatest risk of recurrence (as high as 25 to 65 percent).  The risk of preeclampsia in a second pregnancy is much lower (5 to 7 percent) for women who had preeclampsia without severe features in their first pregnancy and less than 1 percent in women who had a normotensive first pregnancy (does not apply to abortions). These relationships were illustrated by a series of 125 women with severe second-trimester preeclampsia followed for five years: 65 percent developed recurrent preeclampsia and 35 percent were normotensive in their subsequent pregnancy. Of the preeclamptic group, approximately one-third developed the disease at ?27 weeks, one-third at 28 to 36 weeks, and one-third at ?37 weeks. Thus, 21 percent of subsequent pregnancies were complicated by severe preeclampsia in the second trimester.  Recurrent preeclampsia is more likely following a  preeclamptic savage pregnancy than a preeclamptic twin pregnancy. A 2015 meta-analysis of individual patient data from 512 women with HELLP who became pregnant again found that 7 percent developed HELLP in a subsequent pregnancy. In addition, 18 percent developed preeclampsia and 18 percent gestational hypertension.     Prediction  The ability to predict preeclampsia is currently of limited benefit because neither the development of the disorder nor its progression from mild to severe disease can be prevented in most patients, and there is no cure except delivery. Nevertheless, the accurate identification of women at risk, early diagnosis, and prompt and appropriate management will lead to an improvement in maternal, and possibly , outcome.            Risk factors for preeclampsia include: Nulliparity,  Preeclampsia in a previous pregnancy,  Age >40 years or <18 years,  Family history of pregnancy-induced hypertension, Chronic hypertension,  Chronic renal disease,  Antiphospholipid antibody syndrome or inherited thrombophilia, Vascular or connective tissue disease, Diabetes mellitus (pregestational and gestational),  Multifetal gestation, High body mass index,  Male partner whose previous partner had preeclampsia, Hydrops fetalis, and Unexplained fetal growth restriction.             The weight of evidence indicates that inherited thrombophilias (such as Factor V Leiden mutation, prothrombin gene mutation, protein C or S deficiency, antithrombin III deficiency) are not associated with preeclampsia. Therefore, screening pregnant women for inherited thrombophilias is not useful for predicting those at high risk of developing preeclampsia. Measurement of angiogenic factors (VEGF, PlGF, sFlt-1, John) in blood or urine is the most promising approach for predicting preeclampsia; however, these tests are investigational and are not available for clinical use at present.            Long-term Health Risks  Data  from multiple observational studies suggest that preeclampsia predicts remote cardiovascular events (eg, hypertension, ischemic heart disease, stroke). Review of all of a woman's pregnancies is necessary to define her long-term risk accurately. Those with early onset severe preeclampsia, recurrent preeclampsia, gestational hypertension, or preeclampsia with onset as a multipara appear to be at highest risk of cardiovascular disease later in life, including during the premenopausal period.   In contrast, preeclampsia/eclampsia occurring late in gestation in primigravid women and followed by a normotensive pregnancy does not appear to be associated with increased remote cardiovascular risk.      Prevention Of Recurrence  Many studies have been performed to try to find a way to prevent preeclampsia.  These include the use of fish oil, vitamin C, Vitamin E, calcium and aspirin.  Vitamin supplements have not been proven to be effective as preventing preeclampsia however low-dose aspirin therapy during pregnancy modestly reduces the risk of preeclampsia in women at high risk for developing the disease. Anticoagulation does not prevent recurrence.  There is no evidence that any therapy prevents recurrent HELLP syndrome, but data are limited.     Antiplatelet Agents  The observation that preeclampsia is associated with increased platelet turnover and increased platelet-derived thromboxane levels led to randomized trials evaluating low-dose aspirin therapy in women thought to be at increased risk of the disease. As opposed to higher dose aspirin therapy, low-dose aspirin (60 to 150 mg per day) diminishes platelet thromboxane synthesis while maintaining vascular wall prostacyclin synthesis. Although not well studied, the beneficial effect of low-dose aspirin for prevention of preeclampsia may also be in part related to modulation of inflammation. The drug has been studied for both prevention of preeclampsia and prevention of  progression from mild to severe disease. It appears to result in a modest reduction in risk of preeclampsia when given to women at moderate to high risk of preeclampsia.  This approach has been studied in over 35,000 women, for both prevention of preeclampsia and prevention of progression of the disease. Low dose aspirin has a modest impact on pregnancy outcome; it reduces the risk of preeclampsia, as well as other adverse pregnancy outcomes (eg,  delivery, fetal growth restriction) by about 10 to 20 percent. Low dose aspirin is safe in pregnancy; thus, it is a reasonable strategy in women with a moderate to high risk of preeclampsia in whom the benefits outweigh the risks.     Clinical Guidelines  Based on the available data, low-dose aspirin is advised for women at high risk for preeclampsia. There is no consensus on the criteria that confer high risk. The [place] Preventive Services Task Force (USPSTF) risk criteria are reasonable.  USPSTF criteria for high risk include one or more of the following:   Previous pregnancy with preeclampsia, especially early onset and with an adverse outcome   Multifetal gestation  Chronic hypertension   Type 1 or 2 diabetes mellitus  Chronic kidney disease  Autoimmune disease (antiphospholipid syndrome, systemic lupus erythematosus)     Women with multiple moderate risk factors for preeclampsia are considered high risk, as well. Identification of women with an appropriate combination of moderate risk factors to be considered high risk is subjective and determined case by case, as the data describing the magnitude of the association between each of these risk factors and development of preeclampsia vary widely and lack consistency. USPSTF criteria for moderate risk include:  Nulliparity  Obesity (body mass index >30 kg/m2)  Family history of preeclampsia in mother or sister  Age ?35 years  Sociodemographic characteristics (, low socioeconomic  level)  Personal risk factors (eg, history of low birth weight or small for gestational age, previous adverse pregnancy outcome, >10 year pregnancy interval)     In light of her history, I advised that she take low-dose aspirin daily.  She is currently taking 81 mg once daily.  We compared and contrasted 81 versus 162 mg daily.      We discussed the recommended plan of care based on her  risk factors.  Luba and her significant other, Jerome, had their questions answered to their satisfaction.    IMPRESSION:  IUP at 20w1d  Normal level 2 ultrasound  Advanced maternal age, low risk cell free DNA screening.  Invasive testing appropriately declined today  Obesity complicating pregnancy  GDM A1 diagnosed in the second trimester  History of preeclampsia    RECOMMENDATIONS:  Continue care with Dr. Marquez  Monthly growth ultrasound in the third trimester with the addition of a BPP at each growth assessment 32 weeks and beyond  Daily low-dose aspirin ( mg  daily)  Weekly nonstress test at 36 weeks  If she develops GDMA2,  testing should be started at 32 weeks and increase to twice weekly at 34 weeks  Delivery is advised at 38 weeks 0 days to 39 weeks 6 days for well-controlled GDM A2    Total time spent 55 minutes this calendar day which includes preparing to see the patient including chart review, obtaining and/or reviewing additional medical history, performing a physical exam and evaluation, documenting clinical information in the electronic medical record, independently interpreting results, counseling the patient, communicating results to the patient/family/caregiver and coordinating care.     Case discussed with patient who demonstrated understanding and agreement with plan.     Thank you for allowing me to participate in the care of this patient.  Please feel free to contact me with any questions.    Elda Joy MD  Maternal-Fetal Medicine       Note to patient and family:  The   Century Cures Act makes medical notes available to patients in the interest of transparency.  However, please be advised that this is a medical document.  It is intended as a peer to peer communication.  It is written in medical language and may contain abbreviations or verbiage that are technical and unfamiliar.  It may appear blunt or direct.  Medical documents are intended to carry relevant information, facts as evident, and the clinical opinion of the practitioner.

## 2024-05-10 NOTE — PROGRESS NOTES
Pt here for level II ultrasound/doctor consult  + FM  Pt c/o occas uterine tightening, denies vag bleeding and leaking fluid.

## 2024-05-11 ENCOUNTER — ROUTINE PRENATAL (OUTPATIENT)
Dept: OBGYN CLINIC | Facility: CLINIC | Age: 34
End: 2024-05-11
Payer: COMMERCIAL

## 2024-05-11 VITALS
DIASTOLIC BLOOD PRESSURE: 72 MMHG | HEIGHT: 64 IN | SYSTOLIC BLOOD PRESSURE: 118 MMHG | BODY MASS INDEX: 35.68 KG/M2 | WEIGHT: 209 LBS | HEART RATE: 75 BPM

## 2024-05-11 DIAGNOSIS — O24.419 GESTATIONAL DIABETES MELLITUS (GDM), ANTEPARTUM, GESTATIONAL DIABETES METHOD OF CONTROL UNSPECIFIED (HCC): ICD-10-CM

## 2024-05-11 DIAGNOSIS — Z34.80 SUPERVISION OF OTHER NORMAL PREGNANCY, ANTEPARTUM (HCC): Primary | ICD-10-CM

## 2024-05-11 PROCEDURE — 3074F SYST BP LT 130 MM HG: CPT | Performed by: OBSTETRICS & GYNECOLOGY

## 2024-05-11 PROCEDURE — 3078F DIAST BP <80 MM HG: CPT | Performed by: OBSTETRICS & GYNECOLOGY

## 2024-05-11 PROCEDURE — 3008F BODY MASS INDEX DOCD: CPT | Performed by: OBSTETRICS & GYNECOLOGY

## 2024-05-11 RX ORDER — BLOOD-GLUCOSE METER
1 EACH MISCELLANEOUS 4 TIMES DAILY
COMMUNITY
Start: 2024-05-04

## 2024-05-11 NOTE — PROGRESS NOTES
20w2d seen for routine OB visit.   Denies ctx, lof, vb. Reports good FM.    Patient Active Problem List   Diagnosis    Cervical high risk human papillomavirus (HPV) DNA test positive    Gestational diabetes mellitus (GDM), antepartum ([facility])    History of pre-eclampsia    History of gestational diabetes    Antepartum multigravida of advanced maternal age ([facility])    Obesity affecting pregnancy, antepartum ([facility])    Supervision of other normal pregnancy, antepartum ([facility])        TWG: 3 lb (1.361 kg)   Depression Scale Total: 0 (2024  1:23 PM)      Gen: AAOx3, NAD  Resp: breathing comfortably  Abdomen: gravid, soft, nontender  Ext: nontender, no edema    Plan:  - level II without abnormality, growth f/u scheduled   - blood sugars reviewed - elevated fastings and 1/2 her postprandials, does state she had a cold this week. Continue to monitor, has diabetic RN follow up Monday. Discussed metformin vs insulin and she does prefer insulin if medication is needed. Will refer back to M for diabetes management for likely insulin needs.   - continue low dose ASA, will take 162mg daily  - return precautions reviewed    RTO in 4 week(s).

## 2024-05-13 ENCOUNTER — HOSPITAL ENCOUNTER (OUTPATIENT)
Dept: ENDOCRINOLOGY | Facility: HOSPITAL | Age: 34
End: 2024-05-13
Attending: OBSTETRICS & GYNECOLOGY
Payer: COMMERCIAL

## 2024-05-13 DIAGNOSIS — O24.419 GESTATIONAL DIABETES MELLITUS (GDM), ANTEPARTUM, GESTATIONAL DIABETES METHOD OF CONTROL UNSPECIFIED (HCC): Primary | ICD-10-CM

## 2024-05-13 NOTE — PROGRESS NOTES
Luba Lane  : 1990 was seen for GDM  Individual Follow-Up Counseling    Patient agreed to audio-video visit    Date: 2024  Referring Provider: Dr. Hale  Start time: 1327 End time: 1357    Blood Glucose: She indicates review of her glucose values remain elevated in the morning. FBS: 111-137 mg/dL.  She indicates overall the remaining post prandial glucose values remain below 120 mg/dL.  She states she has discussed her glucose values with OB MD and she has been referred to [facility] physicians for medication management. Reinforced the importance of providing glucose records to MD's as directed.    Gestational Diabetes goals assessed by patient: 3 - ½ the time, Barrier to attaining goals discussed and additional modifications to goals made     Diet:     Following meal plan: Yes/No: Yes  Skips: denies skipping meals  Meals are Balanced.  Carb Intake is adequate.  Protein Intake is adequate.  Food Selections are healthy.    Exercise:   She is walking for 30 minutes daily.    Education:     GDM Overview:  Reviewed target blood glucose values for GDM.  Discussed benefits/risks to mother/baby management options to improve/maintain glucose control.     Healthy Eating:  Reviewed/Reinforced:  Nutrition concepts for pregnancy/healthy eating and effect of food on blood glucose.  Meal planning process and benefits of pre-planning meals/snacks.  Appropriate timing of meals/snacks.  Carb counting.  Additional concepts:  Including non starchy vegetables with meals and discussed various HS snacks    Being Active:  Reviewed benefits of effects of activity on BG values.  Reviewed types of activity, duration, precautions.    Monitoring:  Instructed to report readings to MD as directed.  Call MD: if FBG is > 95 twice in 1 week.     If 2 hr PP is > 120 twice in 1 week at any one meal.    Taking Medication:  Reviewed appropriate timing (if on insulin) of med's.   Reviewed probability of needing medication  adjustments throughout pregnancy.     Reducing Risk:  Discussed management of (hyperglycemia, hypoglycemia) and when to call provider.    Recommendations:      1. Follow recommended meal plan.   2. Test blood glucose as directed.    3. Bring glucose log to each MD visit.   4. Start/continue activity if no restrictions.    5. Additional recommendations: Contact the [facility] as needed for further assistance with meal planning.    Patient verbalized understanding and has no further questions at this time.    Chloe Winston RN

## 2024-05-16 ENCOUNTER — TELEPHONE (OUTPATIENT)
Dept: OBGYN CLINIC | Facility: CLINIC | Age: 34
End: 2024-05-16

## 2024-05-16 DIAGNOSIS — O24.419 GESTATIONAL DIABETES MELLITUS (GDM), ANTEPARTUM, GESTATIONAL DIABETES METHOD OF CONTROL UNSPECIFIED (HCC): Primary | ICD-10-CM

## 2024-05-16 NOTE — TELEPHONE ENCOUNTER
Patient notified.  [facility] phone number provided for scheduling.  Patient verbalized understanding.

## 2024-05-16 NOTE — TELEPHONE ENCOUNTER
----- Message from Elda Joy sent at 5/14/2024 12:15 PM CDT -----  Regarding: RE: early GDM  Dru Olivares,    I dont think she needs another referral but please send an order for the fetal echocardiogram.  We will get her started on insulin too.    Elda  ----- Message -----  From: Maria Elena Billingsley MD  Sent: 5/11/2024   9:59 AM CDT  To: Elda Joy MD  Subject: early GDM                                        Good morning!    I was wondering since Luba was diagnosed with GDM on early glucose screen, would she warrant a fetal echo? She has been checking her glucose for just 5 days, but all of her fastings are >110, so I also wanted to refer back to [facility] for her monitoring and likely insulin needs. Should I place referral for diabetes consult or since she already saw you for level II does she even need another referral?  Thanks!  Elise

## 2024-05-22 ENCOUNTER — OFFICE VISIT (OUTPATIENT)
Dept: PERINATAL CARE | Facility: HOSPITAL | Age: 34
End: 2024-05-22
Attending: OBSTETRICS & GYNECOLOGY

## 2024-05-22 VITALS
SYSTOLIC BLOOD PRESSURE: 121 MMHG | BODY MASS INDEX: 35.68 KG/M2 | HEIGHT: 64 IN | WEIGHT: 209 LBS | DIASTOLIC BLOOD PRESSURE: 73 MMHG

## 2024-05-22 DIAGNOSIS — E66.09 OTHER OBESITY DUE TO EXCESS CALORIES AFFECTING PREGNANCY IN SECOND TRIMESTER (HCC): ICD-10-CM

## 2024-05-22 DIAGNOSIS — O24.112 PRE-EXISTING TYPE 2 DIABETES MELLITUS DURING PREGNANCY IN SECOND TRIMESTER (HCC): ICD-10-CM

## 2024-05-22 DIAGNOSIS — O24.419 GDM (GESTATIONAL DIABETES MELLITUS) (HCC): ICD-10-CM

## 2024-05-22 DIAGNOSIS — O99.212 OTHER OBESITY DUE TO EXCESS CALORIES AFFECTING PREGNANCY IN SECOND TRIMESTER (HCC): ICD-10-CM

## 2024-05-22 DIAGNOSIS — E11.9 TYPE 2 DIABETES MELLITUS WITHOUT COMPLICATION, WITHOUT LONG-TERM CURRENT USE OF INSULIN (HCC): Primary | ICD-10-CM

## 2024-05-22 PROBLEM — O09.90: Status: ACTIVE | Noted: 2024-04-22

## 2024-05-22 PROCEDURE — 93325 DOPPLER ECHO COLOR FLOW MAPG: CPT

## 2024-05-22 PROCEDURE — 76825 ECHO EXAM OF FETAL HEART: CPT | Performed by: OBSTETRICS & GYNECOLOGY

## 2024-05-22 PROCEDURE — 76827 ECHO EXAM OF FETAL HEART: CPT

## 2024-05-22 RX ORDER — INSULIN GLARGINE 300 U/ML
24 INJECTION, SOLUTION SUBCUTANEOUS NIGHTLY
Qty: 5 EACH | Refills: 11 | Status: SHIPPED | OUTPATIENT
Start: 2024-05-22

## 2024-05-22 RX ORDER — PEN NEEDLE, DIABETIC 32 GX3/16"
NEEDLE, DISPOSABLE MISCELLANEOUS
Qty: 100 EACH | Refills: 2 | Status: SHIPPED | OUTPATIENT
Start: 2024-05-22

## 2024-05-22 NOTE — PROGRESS NOTES
Outpatient Maternal-Fetal Medicine Consultation    Dear Dr. Marquez,    Thank you for requesting ultrasound evaluation and maternal fetal medicine consultation on your patient Luba Lane.  As you are aware she is a 33 year old female with a Cunningham pregnancy with Estimated Date of Delivery: 24 .  A maternal-fetal medicine consultation was requested secondary to type 2 diabetes.  Her prenatal records and labs were reviewed.    She had preeclampsia & GDM A1 in her prior pregnancy.    Now has fastings that are uncontrolled.    HISTORY  OB History    Para Term  AB Living   3 1 1 0 1 1   SAB IAB Ectopic Multiple Live Births   0 0 0 0 1     # 1 - Date: , Sex: None, Weight: None, GA: 7w0d, Type: None, Apgar1: None, Apgar5: None, Living: None, Birth Comments: None    # 2 - Date: 22, Sex: Male, Weight: 7 lb 11.5 oz (3.5 kg), GA: 37w2d, Type: Normal spontaneous vaginal delivery, Apgar1: 9, Apgar5: 9, Living: Living, Birth Comments: None    # 3 - Date: None, Sex: None, Weight: None, GA: None, Type: None, Apgar1: None, Apgar5: None, Living: None, Birth Comments: None    Past Medical History  The patient  has a past medical history of Anemia, Gestational diabetes (HCC), and Pregnancy-induced hypertension (HCC).    Past Surgical History  The patient  has a past surgical history that includes other (2017).    Family History  The patient She indicated that her mother is alive. She indicated that her father is alive.      Medications:   Current Outpatient Medications:     famotidine 10 MG Oral Tab, Take 1 tablet (10 mg total) by mouth 2 (two) times daily., Disp: , Rfl:     aspirin 81 MG Oral Tab EC, Take 1 tablet (81 mg total) by mouth daily., Disp: , Rfl:     Prenatal 27-0.8 MG Oral Tab, Take 1 tablet by mouth daily., Disp: , Rfl:     Blood Glucose Monitoring Suppl (ONETOUCH ULTRA 2) w/Device Does not apply Kit, 1 each 4 (four) times daily., Disp: , Rfl:     Glucose Blood (CONTOUR  NEXT TEST) In Vitro Strip, 1 each by Other route 4 (four) times daily. Please test blood sugars 4x per day. Test in the morning and 2 hours after each meal., Disp: 120 strip, Rfl: 4    Blood Glucose Monitoring Suppl (CONTOUR NEXT ONE) Does not apply Kit, 1 kit by Finger stick route daily. Test sugar 4x daily. Fasting and 2 hours after each meal., Disp: 1 kit, Rfl: 0    Microlet Lancets Does not apply Misc, Inject 1 Lancet into the skin 4 (four) times daily., Disp: 200 each, Rfl: 3  Allergies: No Known Allergies      PHYSICAL EXAMINATION:  /73 (BP Location: Right arm, Patient Position: Sitting, Cuff Size: large)   Ht 5' 4\" (1.626 m)   Wt 209 lb (94.8 kg)   LMP 12/21/2023 (Exact Date)   BMI 35.87 kg/m²   General: alert and oriented,no acute distress  Abdomen: gravid, soft, non-tender          OBSTETRIC ULTRASOUND  The patient had a fetal echo ultrasound today which I interpreted the results and reviewed them with the patient.  FETAL ECHOCARDIOGRAM:    Single IUP in breech presentation.    Placenta is posterior, fundal.   A 3 vessel cord is noted.  Cardiac activity is present at 149 bpm  MVP is 5.9 cm .      A transabdominal 2D Doppler, fetal echocardiogram is performed. There is a four-chamber heart of appropriate cardiac dimensions. There is situs solitus with levocardia. Intracardiac connection appear to be normal.  The  atrioventricular valves appear normal. There is no evidence of pericardial or pleural effusion. The A-V conduction is one to one and the heart rate is appropriate for gestational age. No evidence of fetal arrhythmias is seen during today's study. There is a right to left shunting across the patent clarence ovale. There is a normal appearance of the aorta and branching pattern of the head vessels.    Normal fetal Doppler interrogations    There appears to be a structurally  normal fetal heart and rhythm. The patient was made aware of the limitations of the fetal heart study: malformations  such as but not limiting to minor valve , VSD, anomalous pulmonary venus return, or coarctation of the aorta maybe missed. I discussed the results with the patient.          See imaging tab for the complete US report.    DISCUSSION  During her visit we discussed and reviewed the following issues:  GESTATIONAL DIABETES    3 hour GTT  Lab Results   Component Value Date    GLUFG 110 (H) 2024    GLU1G 202 (H) 2024    GLU2G 151 2024    GLU3G 156 (H) 2024     Probable Type 2 diabetes:  Fastings have been uncontrolled. Insulin ordered : 24 hour insulin at 24 units at bedtime .    Medical Regimen Recommendation:   Continue ADA diet & monitoring with her OB and dietician      ADVANCED MATERNAL AGE    Background  I reviewed with the patient that pregnancies in women of advanced maternal age (35 or older at delivery) are associated with elevated risks. Specifically, there is a higher rate of:  Fetal malformations  Preeclampsia  Gestational diabetes  Intrauterine fetal death   Please see previous [facility] detailed discussion.       OBESITY:  Her BMI prior to pregnancy was 35.3   Please see previous [facility] detailed discussion.     PRIOR PREECLAMPSIA  History:  The patient developed preeclampsia at 36 weeks and was induced at 37 weeks.  She was put on magnesium sulfate but did not need to go home on antihypertensive medication.      Please see previous [facility] detailed discussion.     We discussed the recommended plan of care based on her  risk factors.  Luba and her significant other, Jerome, had their questions answered to their satisfaction.    IMPRESSION:  IUP at 21w6d   Advanced maternal age, low risk cell free DNA screening.  Invasive testing declined   Obesity complicating pregnancy  Probable type 2 diabetes.  History of preeclampsia  Normal fetal echo    RECOMMENDATIONS:  Continue care with Dr. Marquez  Monthly growth ultrasound in the third trimester with the addition of a BPP at each growth  assessment 32 weeks and beyond  Daily low-dose aspirin ( mg  daily)  Weekly nonstress test at 32 weeks, twice weekly at 34 weeks  Delivery is advised at 38 weeks 0 days to 39 weeks 6 days   Check glucoses 4 times a day and upload to glucose flowsheet in my chart every 1 to 2 days for weekly MFM review  Insulin as above.    Total time spent 40 minutes this calendar day which includes preparing to see the patient including chart review, obtaining and/or reviewing additional medical history, performing a physical exam and evaluation, documenting clinical information in the electronic medical record, independently interpreting results, counseling the patient, communicating results to the patient/family/caregiver and coordinating care.     Case discussed with patient who demonstrated understanding and agreement with plan.     Thank you for allowing me to participate in the care of this patient.  Please feel free to contact me with any questions.    Val Ricardo MD   Maternal-Fetal Medicine       Note to patient and family:  The 21st Century Cures Act makes medical notes available to patients in the interest of transparency.  However, please be advised that this is a medical document.  It is intended as a peer to peer communication.  It is written in medical language and may contain abbreviations or verbiage that are technical and unfamiliar.  It may appear blunt or direct.  Medical documents are intended to carry relevant information, facts as evident, and the clinical opinion of the practitioner.

## 2024-05-24 ENCOUNTER — PATIENT MESSAGE (OUTPATIENT)
Dept: OBGYN CLINIC | Facility: CLINIC | Age: 34
End: 2024-05-24

## 2024-05-24 RX ORDER — BLOOD SUGAR DIAGNOSTIC
4 STRIP MISCELLANEOUS DAILY
Qty: 100 STRIP | Refills: 2 | Status: SHIPPED | OUTPATIENT
Start: 2024-05-24

## 2024-05-24 NOTE — TELEPHONE ENCOUNTER
From: Luba Lane  To: Geetha Hale  Sent: 5/24/2024 10:42 AM CDT  Subject: Glucose Monitoring Issue    Good morning,     I have been running into a problem regarding glucose monitoring. I am nearly out of one touch test strips for glucose monitoring. I have attempted to request an early refill through my pharmacy but have been apparently denied by insurance. I will run out of strips by tomorrow and will not be allowed a refill until 6/14 according to my pharmacy account. Is there a way to have an order written for an early refill in order to be approved by insurance? I am meant to start insulin and I need to be able to see how it affects my numbers, but will be unable to without having access to these test strips. I am attaching a photo showing how many strips I have left in case that is needed.     Thanks!     Luba Lane

## 2024-05-29 ENCOUNTER — TELEPHONE (OUTPATIENT)
Dept: OBGYN CLINIC | Facility: CLINIC | Age: 34
End: 2024-05-29

## 2024-05-29 ENCOUNTER — TELEPHONE (OUTPATIENT)
Dept: PERINATAL CARE | Facility: HOSPITAL | Age: 34
End: 2024-05-29

## 2024-05-29 PROBLEM — O24.414 INSULIN CONTROLLED GESTATIONAL DIABETES MELLITUS (GDM) IN SECOND TRIMESTER (HCC): Status: ACTIVE | Noted: 2022-02-07

## 2024-05-29 RX ORDER — INSULIN GLARGINE 300 U/ML
40 INJECTION, SOLUTION SUBCUTANEOUS NIGHTLY
Qty: 5 EACH | Refills: 11 | Status: SHIPPED | OUTPATIENT
Start: 2024-05-29 | End: 2024-05-29

## 2024-05-29 RX ORDER — INSULIN GLARGINE 300 U/ML
24 INJECTION, SOLUTION SUBCUTANEOUS NIGHTLY
Qty: 5 EACH | Refills: 11 | Status: SHIPPED | OUTPATIENT
Start: 2024-05-29

## 2024-05-29 NOTE — TELEPHONE ENCOUNTER
LONNY....  Pt. 22 weeks 6 days..  Was started on insulin per MFM....GDM ,AMA,Hx. Pre-eclapsia  Last seen 24 HARPREET Dr. Billingsley  Next appointment. With Dr. Whyte 24 ...24  week HARPREET...

## 2024-05-29 NOTE — TELEPHONE ENCOUNTER
22w6d  Received BS log for date range 5/23-5/28     Low  High Out of Range   Fasting Blood Sugar 109 118 6/6   Post Breakfast 85 90 0/5   Post Lunch 88 143 2/6   Post Dinner 87 109 0/6     Patient is currently taking toujeo 24 units at bedtime.     **Pt had one high fasting reading of 126--pt didn't take insulin 5/28 d/t awaiting refill of pen needles.    To Dr. Ricardo for review.

## 2024-06-05 ENCOUNTER — TELEPHONE (OUTPATIENT)
Dept: PERINATAL CARE | Facility: HOSPITAL | Age: 34
End: 2024-06-05

## 2024-06-05 NOTE — TELEPHONE ENCOUNTER
Her blood sugars are reviewed and she will need to increase her insulin.  Please have her increase her insulin to Toujeo 34 units at bedtime.

## 2024-06-05 NOTE — TELEPHONE ENCOUNTER
23w6d  Received BS log for date range 5/30-6/4     Low  High Out of Range   Fasting Blood Sugar 92 116 5/6   Post Breakfast 85 103 0/6   Post Lunch 91 120 1/5   Post Dinner 87 117 0/6     Patient is currently taking Toujeo 24 units at bedtime.     Pt started insulin 5/29.    To Dr. Joy for review.    No

## 2024-06-12 ENCOUNTER — TELEPHONE (OUTPATIENT)
Dept: PERINATAL CARE | Facility: HOSPITAL | Age: 34
End: 2024-06-12

## 2024-06-12 RX ORDER — INSULIN GLARGINE 300 U/ML
42 INJECTION, SOLUTION SUBCUTANEOUS NIGHTLY
Qty: 5 EACH | Refills: 11 | Status: SHIPPED | OUTPATIENT
Start: 2024-06-12

## 2024-06-12 NOTE — TELEPHONE ENCOUNTER
Toujeo 42 units HS     Fastings were similarly elevated prior to the last week. Therefore, I increased Toujeo.

## 2024-06-12 NOTE — TELEPHONE ENCOUNTER
24w6d  Received BS log for date range 6/5-6/11     Low  High Out of Range   Fasting Blood Sugar 95 118 7/7   Post Breakfast 82 120 1/6   Post Lunch 84 140 1/7   Post Dinner 73 110 0/6     Pt noted that she was on vacation 4 of these days.    Patient is currently taking:    Toujeo 34 units HS     To Dr. Ricardo for review.

## 2024-06-14 ENCOUNTER — ROUTINE PRENATAL (OUTPATIENT)
Dept: OBGYN CLINIC | Facility: CLINIC | Age: 34
End: 2024-06-14
Payer: COMMERCIAL

## 2024-06-14 VITALS
WEIGHT: 209.19 LBS | BODY MASS INDEX: 36 KG/M2 | SYSTOLIC BLOOD PRESSURE: 118 MMHG | HEART RATE: 108 BPM | DIASTOLIC BLOOD PRESSURE: 76 MMHG

## 2024-06-14 DIAGNOSIS — Z34.90 PRENATAL CARE, ANTEPARTUM (HCC): Primary | ICD-10-CM

## 2024-06-14 DIAGNOSIS — Z36.9 ANTENATAL SCREENING ENCOUNTER (HCC): ICD-10-CM

## 2024-06-14 PROCEDURE — 3078F DIAST BP <80 MM HG: CPT | Performed by: OBSTETRICS & GYNECOLOGY

## 2024-06-14 PROCEDURE — 3074F SYST BP LT 130 MM HG: CPT | Performed by: OBSTETRICS & GYNECOLOGY

## 2024-06-14 RX ORDER — PEN NEEDLE, DIABETIC 32GX 5/32"
NEEDLE, DISPOSABLE MISCELLANEOUS
COMMUNITY
Start: 2024-05-29

## 2024-06-14 NOTE — PROGRESS NOTES
Chief Complaint   Patient presents with    Prenatal Care     HARPREET      Routine prenatal visit.  Patient without complaints.  Good fetal movement  Patient denies any bleeding, leaking fluid, cramping, or contractions.    Assessment/Plan:  25w1d doing well  HIV, RPR and CBC 28 wks  Hx of positive HPV x 2- colposcopy was recommended.  Patient declines and will do it postpartum.  Reviewed risk of progression of dysplasia.  Obesity/DM- overall doing well, but still with mildly elevated fasting BS's per patient.  MFM following.  NST 32 wks, twice weekly 34 wks.  Monthly growth MFM  Blood type A pos  Reviewed  labor signs and symptoms.    Diagnoses and all orders for this visit:    Prenatal care, antepartum (HCC)     screening encounter ([facility])  -     HIV Ag/Ab Combo; Future  -     T Pallidum Screening [place]; Future  -     CBC With Differential With Platelet; Future      Return in about 3 weeks (around 2024) for Routine Prenatal Visit, NST 32 wks, twice weekly 34 wks..

## 2024-06-19 ENCOUNTER — TELEPHONE (OUTPATIENT)
Dept: PERINATAL CARE | Facility: HOSPITAL | Age: 34
End: 2024-06-19

## 2024-06-21 ENCOUNTER — TELEPHONE (OUTPATIENT)
Dept: PERINATAL CARE | Facility: HOSPITAL | Age: 34
End: 2024-06-21

## 2024-06-21 RX ORDER — INSULIN GLARGINE 300 U/ML
50 INJECTION, SOLUTION SUBCUTANEOUS NIGHTLY
Qty: 5 EACH | Refills: 11 | Status: SHIPPED | OUTPATIENT
Start: 2024-06-21

## 2024-06-21 NOTE — TELEPHONE ENCOUNTER
Please have her increase her insulin the following:      Toujeo 50 units HS     She should make sure she is having a well-balanced breakfast with protein and carbs.  Let her know she should contact [facility] if she has recurrent low blood sugars less than 70.

## 2024-06-21 NOTE — TELEPHONE ENCOUNTER
26w1d  Received BS log for date range 6/13-6/20     Low  High Out of Range   Fasting Blood Sugar 92 110 7/8   Post Breakfast 58 * 110 0/8   Post Lunch 85 126 2/8   Post Dinner 80 140 1/8     *Other post breakfast lows: 90, 92, 93.....    Patient is currently taking:    Toujeo 42 units HS     To Dr. Joy for review.

## 2024-06-28 ENCOUNTER — PATIENT MESSAGE (OUTPATIENT)
Dept: PERINATAL CARE | Facility: HOSPITAL | Age: 34
End: 2024-06-28

## 2024-06-28 ENCOUNTER — TELEPHONE (OUTPATIENT)
Dept: PERINATAL CARE | Facility: HOSPITAL | Age: 34
End: 2024-06-28

## 2024-06-28 RX ORDER — INSULIN GLARGINE 300 U/ML
60 INJECTION, SOLUTION SUBCUTANEOUS NIGHTLY
Qty: 5 EACH | Refills: 11 | Status: SHIPPED | OUTPATIENT
Start: 2024-06-28

## 2024-06-28 NOTE — TELEPHONE ENCOUNTER
27w1d  Received BS log for date range 6/21-6/28     Low  High Out of Range   Fasting Blood Sugar 92 105 6/8   Post Breakfast 93 119 0/7   Post Lunch 88 114 0/7   Post Dinner 89 119 0/7     Patient is currently taking:    Toujeo 50 units HS     To Dr. Joy for review.

## 2024-07-05 ENCOUNTER — TELEPHONE (OUTPATIENT)
Dept: PERINATAL CARE | Facility: HOSPITAL | Age: 34
End: 2024-07-05

## 2024-07-05 RX ORDER — INSULIN GLARGINE 300 U/ML
70 INJECTION, SOLUTION SUBCUTANEOUS NIGHTLY
COMMUNITY
Start: 2024-07-05 | End: 2024-07-19 | Stop reason: DRUGHIGH

## 2024-07-05 NOTE — TELEPHONE ENCOUNTER
28w1d  Received BS log for date range 6/29-7/4     Low  High Out of Range   Fasting Blood Sugar 90 108 3/6   Post Breakfast 80 108 0/6   Post Lunch 87 119 0/5   Post Dinner 89 112 0/4     Patient is currently taking Toujeo 60 units at bedtime.     To Dr. Diehl for review.

## 2024-07-06 ENCOUNTER — LAB ENCOUNTER (OUTPATIENT)
Dept: LAB | Facility: HOSPITAL | Age: 34
End: 2024-07-06
Attending: OBSTETRICS & GYNECOLOGY
Payer: COMMERCIAL

## 2024-07-06 DIAGNOSIS — Z36.9 ANTENATAL SCREENING ENCOUNTER (HCC): ICD-10-CM

## 2024-07-06 LAB
BASOPHILS # BLD AUTO: 0.03 X10(3) UL (ref 0–0.2)
BASOPHILS NFR BLD AUTO: 0.3 %
EOSINOPHIL # BLD AUTO: 0.44 X10(3) UL (ref 0–0.7)
EOSINOPHIL NFR BLD AUTO: 4.2 %
ERYTHROCYTE [DISTWIDTH] IN BLOOD BY AUTOMATED COUNT: 12.6 %
HCT VFR BLD AUTO: 29.4 %
HGB BLD-MCNC: 10.1 G/DL
IMM GRANULOCYTES # BLD AUTO: 0.19 X10(3) UL (ref 0–1)
IMM GRANULOCYTES NFR BLD: 1.8 %
LYMPHOCYTES # BLD AUTO: 1.76 X10(3) UL (ref 1–4)
LYMPHOCYTES NFR BLD AUTO: 16.9 %
MCH RBC QN AUTO: 26.6 PG (ref 26–34)
MCHC RBC AUTO-ENTMCNC: 34.4 G/DL (ref 31–37)
MCV RBC AUTO: 77.6 FL
MONOCYTES # BLD AUTO: 0.59 X10(3) UL (ref 0.1–1)
MONOCYTES NFR BLD AUTO: 5.7 %
NEUTROPHILS # BLD AUTO: 7.4 X10 (3) UL (ref 1.5–7.7)
NEUTROPHILS # BLD AUTO: 7.4 X10(3) UL (ref 1.5–7.7)
NEUTROPHILS NFR BLD AUTO: 71.1 %
PLATELET # BLD AUTO: 262 10(3)UL (ref 150–450)
RBC # BLD AUTO: 3.79 X10(6)UL
T PALLIDUM AB SER QL IA: NONREACTIVE
WBC # BLD AUTO: 10.4 X10(3) UL (ref 4–11)

## 2024-07-06 PROCEDURE — 86780 TREPONEMA PALLIDUM: CPT

## 2024-07-06 PROCEDURE — 87389 HIV-1 AG W/HIV-1&-2 AB AG IA: CPT

## 2024-07-06 PROCEDURE — 85025 COMPLETE CBC W/AUTO DIFF WBC: CPT

## 2024-07-06 PROCEDURE — 36415 COLL VENOUS BLD VENIPUNCTURE: CPT

## 2024-07-08 ENCOUNTER — OFFICE VISIT (OUTPATIENT)
Dept: PERINATAL CARE | Facility: HOSPITAL | Age: 34
End: 2024-07-08
Attending: OBSTETRICS & GYNECOLOGY
Payer: COMMERCIAL

## 2024-07-08 ENCOUNTER — ROUTINE PRENATAL (OUTPATIENT)
Dept: OBGYN CLINIC | Facility: CLINIC | Age: 34
End: 2024-07-08
Payer: COMMERCIAL

## 2024-07-08 VITALS
DIASTOLIC BLOOD PRESSURE: 72 MMHG | BODY MASS INDEX: 36 KG/M2 | WEIGHT: 211.5 LBS | SYSTOLIC BLOOD PRESSURE: 116 MMHG | HEART RATE: 93 BPM

## 2024-07-08 VITALS
DIASTOLIC BLOOD PRESSURE: 75 MMHG | WEIGHT: 210 LBS | BODY MASS INDEX: 35.85 KG/M2 | SYSTOLIC BLOOD PRESSURE: 120 MMHG | HEART RATE: 96 BPM | HEIGHT: 64 IN

## 2024-07-08 DIAGNOSIS — O99.210 OBESITY AFFECTING PREGNANCY (HCC): ICD-10-CM

## 2024-07-08 DIAGNOSIS — Z23 NEED FOR VACCINATION: Primary | ICD-10-CM

## 2024-07-08 DIAGNOSIS — O24.414 INSULIN CONTROLLED GESTATIONAL DIABETES MELLITUS (GDM) IN THIRD TRIMESTER (HCC): ICD-10-CM

## 2024-07-08 DIAGNOSIS — O24.419 GDM (GESTATIONAL DIABETES MELLITUS) (HCC): Primary | ICD-10-CM

## 2024-07-08 DIAGNOSIS — O09.529 ANTEPARTUM MULTIGRAVIDA OF ADVANCED MATERNAL AGE (HCC): ICD-10-CM

## 2024-07-08 DIAGNOSIS — O24.419 GDM (GESTATIONAL DIABETES MELLITUS) (HCC): ICD-10-CM

## 2024-07-08 DIAGNOSIS — Z34.90 PRENATAL CARE, ANTEPARTUM (HCC): ICD-10-CM

## 2024-07-08 PROCEDURE — 3078F DIAST BP <80 MM HG: CPT | Performed by: OBSTETRICS & GYNECOLOGY

## 2024-07-08 PROCEDURE — 76816 OB US FOLLOW-UP PER FETUS: CPT | Performed by: OBSTETRICS & GYNECOLOGY

## 2024-07-08 PROCEDURE — 76819 FETAL BIOPHYS PROFIL W/O NST: CPT

## 2024-07-08 PROCEDURE — 3074F SYST BP LT 130 MM HG: CPT | Performed by: OBSTETRICS & GYNECOLOGY

## 2024-07-08 RX ORDER — FERROUS SULFATE 325(65) MG
325 TABLET, DELAYED RELEASE (ENTERIC COATED) ORAL
COMMUNITY

## 2024-07-08 NOTE — PROGRESS NOTES
Outpatient Maternal-Fetal Medicine Consultation    Dear Dr. Marquez,    Thank you for requesting ultrasound evaluation and maternal fetal medicine consultation on your patient Luba Lane.  As you are aware she is a 33 year old female with a Cunningham pregnancy with Estimated Date of Delivery: 24 .  A maternal-fetal medicine consultation was requested secondary to type 2 diabetes.  Her prenatal records and labs were reviewed.    She had preeclampsia & GDM A1 in her prior pregnancy.    Now has fastings that are uncontrolled.    HISTORY  OB History    Para Term  AB Living   3 1 1 0 1 1   SAB IAB Ectopic Multiple Live Births   0 0 0 0 1     # 1 - Date: , Sex: None, Weight: None, GA: 7w0d, Type: None, Apgar1: None, Apgar5: None, Living: None, Birth Comments: None    # 2 - Date: 22, Sex: Male, Weight: 7 lb 11.5 oz (3.5 kg), GA: 37w2d, Type: Normal spontaneous vaginal delivery, Apgar1: 9, Apgar5: 9, Living: Living, Birth Comments: None    # 3 - Date: None, Sex: None, Weight: None, GA: None, Type: None, Apgar1: None, Apgar5: None, Living: None, Birth Comments: None    Past Medical History  The patient  has a past medical history of Anemia, Gestational diabetes (HCC), and Pregnancy-induced hypertension (HCC).    Past Surgical History  The patient  has a past surgical history that includes other (2017).    Family History  The patient She indicated that her mother is alive. She indicated that her father is alive.      Medications:   Current Outpatient Medications:     ferrous sulfate 325 (65 FE) MG Oral Tab EC, Take 1 tablet (325 mg total) by mouth daily with breakfast., Disp: , Rfl:     Insulin Glargine, 1 Unit Dial, (TOUJEO SOLOSTAR) 300 UNIT/ML Subcutaneous Solution Pen-injector, Inject 64 Units into the skin at bedtime., Disp: , Rfl:     famotidine 10 MG Oral Tab, Take 1 tablet (10 mg total) by mouth 2 (two) times daily., Disp: , Rfl:     aspirin 81 MG Oral Tab EC, Take 1  tablet (81 mg total) by mouth daily., Disp: , Rfl:     Prenatal 27-0.8 MG Oral Tab, Take 1 tablet by mouth daily., Disp: , Rfl:     BD PEN NEEDLE BERNARD 2ND GEN 32G X 4 MM Does not apply Misc, USE NEW NEEDLE WITH EACH INJECTION NIGHTLY, Disp: , Rfl:     Glucose Blood (BLOOD GLUCOSE TEST STRIPS 333) In Vitro Strip, 4 strips daily. Please dispense any brand that insurance will cover (patient using One Touch). Patient will check glucose 4 times daily. Fasting in the morning, 2 hours after breakfast, 2 hours after lunch, 2 hours after dinner., Disp: 100 strip, Rfl: 2    Insulin Pen Needle (CAREFINE PEN NEEDLES) 32G X 5 MM Does not apply Misc, Use new needle with each injection nightly, Disp: 100 each, Rfl: 2    Blood Glucose Monitoring Suppl (ONETOUCH ULTRA 2) w/Device Does not apply Kit, 1 each 4 (four) times daily., Disp: , Rfl:     Glucose Blood (CONTOUR NEXT TEST) In Vitro Strip, 1 each by Other route 4 (four) times daily. Please test blood sugars 4x per day. Test in the morning and 2 hours after each meal., Disp: 120 strip, Rfl: 4    Blood Glucose Monitoring Suppl (CONTOUR NEXT ONE) Does not apply Kit, 1 kit by Finger stick route daily. Test sugar 4x daily. Fasting and 2 hours after each meal., Disp: 1 kit, Rfl: 0    Microlet Lancets Does not apply Misc, Inject 1 Lancet into the skin 4 (four) times daily., Disp: 200 each, Rfl: 3  Allergies: No Known Allergies      PHYSICAL EXAMINATION:  /75 (BP Location: Right arm, Patient Position: Sitting, Cuff Size: adult)   Pulse 96   Ht 5' 4\" (1.626 m)   Wt 210 lb (95.3 kg)   LMP 12/21/2023 (Exact Date)   BMI 36.05 kg/m²   General: alert and oriented,no acute distress  Abdomen: gravid, soft, non-tender        DISCUSSION  During her visit we discussed and reviewed the following issues:  GESTATIONAL DIABETES    3 hour GTT  Lab Results   Component Value Date    GLUFG 110 (H) 04/14/2024    GLU1G 202 (H) 04/14/2024    GLU2G 151 04/14/2024    GLU3G 156 (H) 04/14/2024      Probable Type 2 diabetes: I reviewed her glucose logs and she is in good control.    Medical Regimen Recommendation:   Continue ADA diet   Toujeo 64 units at bedtime.     ADVANCED MATERNAL AGE    Background  I reviewed with the patient that pregnancies in women of advanced maternal age (35 or older at delivery) are associated with elevated risks. Specifically, there is a higher rate of:  Fetal malformations  Preeclampsia  Gestational diabetes  Intrauterine fetal death   Please see previous MFM detailed discussion.       OBESITY:  Her BMI prior to pregnancy was 35.3   Please see previous MFM detailed discussion.     PRIOR PREECLAMPSIA  History:  The patient developed preeclampsia at 36 weeks and was induced at 37 weeks.  She was put on magnesium sulfate but did not need to go home on antihypertensive medication.      Please see previous MFM detailed discussion.     We discussed the recommended plan of care based on her  risk factors.  Luba and her significant other, Jerome, had their questions answered to their satisfaction.        OB ULTRASOUND REPORT   See imaging tab for complete ultrasound report or in PACS    Single IUP in cephalic presentation.    Placenta is posterior, fundal.   A 3 vessel cord is noted.  Cardiac activity is present at 138 bpm  EFW 1180 g ( 2 lb 10 oz); 44%.    TONY is  20.4 cm.  MVP is 6.2 cm        IMPRESSION:  IUP at 28w4d   Advanced maternal age, low risk cell free DNA screening.  Invasive testing declined   Obesity complicating pregnancy  Probable type 2 diabetes.  History of preeclampsia      RECOMMENDATIONS:  Continue care with Dr. Marquez  Monthly growth ultrasound with the addition of a BPP at each growth assessment 32 weeks and beyond  Daily low-dose aspirin ( mg  daily)  Weekly nonstress test at 32 weeks, twice weekly at 34 weeks  Delivery is advised at 38 weeks 0 days to 39 weeks   Check glucoses 4 times a day and upload to glucose flowsheet in my chart every  1 to 2 days for weekly MFM review  Insulin as above.    Total time spent 20 minutes this calendar day which includes preparing to see the patient including chart review, obtaining and/or reviewing additional medical history, performing a physical exam and evaluation, documenting clinical information in the electronic medical record, independently interpreting results, counseling the patient, communicating results to the patient/family/caregiver and coordinating care.     Case discussed with patient who demonstrated understanding and agreement with plan.     Thank you for allowing me to participate in the care of this patient.  Please feel free to contact me with any questions.    Romain Suh D.O.  Maternal Fetal Medicine        Note to patient and family:  The 21st Century Cures Act makes medical notes available to patients in the interest of transparency.  However, please be advised that this is a medical document.  It is intended as a peer to peer communication.  It is written in medical language and may contain abbreviations or verbiage that are technical and unfamiliar.  It may appear blunt or direct.  Medical documents are intended to carry relevant information, facts as evident, and the clinical opinion of the practitioner.

## 2024-07-08 NOTE — PROGRESS NOTES
Chief Complaint   Patient presents with    Prenatal Care     HARPREET     Routine prenatal visit.  Patient without complaints.  Good fetal movement  Patient denies any bleeding, leaking fluid, cramping, or contractions.    Assessment/Plan:  28w4d doing well  AMA/Obesity/DM- monthly growth, NST 32 wks, twice weekly 34 wks.  Labs done  Blood type A pos  Reviewed vaccine recommendations:  Tdap today.  Kick count information given.  Reviewed  labor signs and symptoms.    Diagnoses and all orders for this visit:    Need for vaccination  -     TdaP (Boostrix/Adacel) Vaccine (> 7 Y)    Prenatal care, antepartum (HCC)        Return in about 2 weeks (around 2024) for Routine Prenatal Visit, NST 32 wks, twice weekly 34 wks..

## 2024-07-08 NOTE — PATIENT INSTRUCTIONS
LABOR & DELIVERY PRE-REGISTRATION    Thank you for choosing [facility] for you labor and delivery needs.  We look forward to caring for you and your family in this exciting time.  In order to better care for all of our patients, [facility] recommends that you complete your delivery registration as early in your pregnancy as possible.  Registering for your delivery in advance saves you the time of doing so on your day of delivery and allows your care team to be better prepared for your delivery date.  For more information about Labor and Delivery at [facility] and to pre-register, visit [email or web address]--> Search Our Services-->Pregnancy & Baby.    TWO WAYS TO REGISTER ONLINE    Epic MyChart allows access to multiple medical organizations, including [facility] and other medical organizations that use the Epic electronic health record system.  To add [facility] or any other medical organization, just tap My Organizations at the top left corner of the login page in the [employer] [facility], and then click Add Organization.  [place] [facility] [employer]- Pre-register for your hospital stay through your [employer] account.  After logging into [employer], click on Visits.  Under Visits, click on [place] for Delivery and follow the directions to register.  You may print the confirmation page.  If you do not already have a [employer] account, ask our office for an Access Code.  Go to [email or web address] and follow the prompts to set up your account.  [place] on [email or web address]- Pre-register for your hospital stay through the convenient online form on our website.  Go to [email or web address].  Scroll down the page and click on [facility].  Fill out all of the required information and click submit.  You will receive a confirmation of your submission.  Questions about registering for your hospital stay?  Contact the [facility]-Labor & Delivery at [phone].      PRENATAL CLASSES    Both in-person and  virtual classes are available.  Weeknight and weekend classes are available.  Go to [email or web address]   or   [email or web address]  Click on drop down menu on the right side  Scroll down to \"Find a Class or Support Group\"  Scroll down and click on \"[facility]\"  Type any of the following in the Search Bar  - Babycare  - Virtual Tour- for the [facility]  - Natural Childbirth (even if you are planning on having an epidural).  Topics include labor and birth, breathing techniques, epidurals, postpartum issues.  - Virtual Childbirth Express  - Breastfeeding       FETAL MOVEMENT CHART    Although not all women need to perform daily fetal movement counts, if you notice a decrease in fetal activity, you should contact our office immediately.      Begin counting fetal movements at 32 weeks of pregnancy.  You may find that your baby is more active after eating or drinking.       We want you to time how long it takes to feel 10 movements (kicks, flutters, swishes or rolls).  You should feel at least 10 movements in 2 hours.  You will likely feel 10 movements in less than 2 hours.    If you have concerns, you can use the attached table to record movements.  Record the time you feel the first movement and the tenth movement.  This will help you observe patterns and discover how long it normally takes your baby to move 10 times.       Please call us if any of the following occurs:  You have not felt the baby move for a couple of hours  It is taking longer each day to get the tenth movement    The main point is we want you to know the characteristics of your baby, so you can tell the doctor or nurse if something different is happening.    Again, if you notice a decrease in fetal movement, please give the office a call.    If our office is closed, the answering service will page the doctor on-call.    ------------------------------      Time M T W Th F S S                                                                                                                  Time M T W Th F S S                                                                                                           Time M T W Th F S S                                                                                                           Time M T W Th F S S                                                                                                         VACCINE RECOMMENDATIONS     Pertussis- [place] has significant outbreaks of pertussis (whooping cough) every year.  Therefore, the CDC recommends that all pregnant women receive a Tdap vaccine during pregnancy, regardless of whether you have received one previously.  The vaccine reduces your chances of nora the illness, and also provides some natural immunity to your baby for possible exposures after birth.  The best time to get the vaccine is between 27 and 36 weeks.  Baby caregivers (grandparents, spouse) should also make sure they are up to date on the vaccine (within the last 10 years).     Influenza- Pregnant women who develop the flu are more prone to serious complications that can affect both mother and baby.  The CDC recommends that all women who will be pregnant during flu season (October to May) receive the flu vaccine (injection only, not nasal spray).  The vaccine can be given during any stage of pregnancy.     Both vaccines are offered in our office, as well as through many pharmacies and primary care offices.    Covid-19 Vaccine   If you are pregnant or were recently pregnant, you are more likely to get very sick from COVID-19 than people who are not pregnant. Additionally, if you have COVID-19 during pregnancy, you are more likely to have complications that can affect your pregnancy and developing baby.  Please check the CDC website for the most up-to-date recommendations on Covid vaccination at www.cdc.gov/coronavirus/vaccine    COVID-19  vaccination is recommended for everyone ages 6 months and older, including people who are pregnant, breastfeeding, trying to get pregnant now, or who might become pregnant in the future. Evidence shows that COVID-19 vaccination before and during pregnancy is safe and effective and suggests that the benefits of vaccination outweigh any known or potential risks. New data show that vaccination during pregnancy can help protect babies younger than 6 months old, when they are too young to be vaccinated themselves, from hospitalization due to COVID-19.    RSV Vaccine  RSV (Respiratory Syncytial Virus) is a common respiratory virus that causes cold like symptoms in adults and babies.  Infants and adults over 60 years old are more likely to develop severe RSV infection requiring hospitalization.  A new RSV vaccine was released in October 2023 that if given during the third trimester of pregnancy, reduces your baby's risk of being hospitalized with RSV after birth by up to 80%.  Therefore the CDC recommends that pregnant moms receive one dose of the RSV vaccine Pfizer Abrysvo sometime between 32 and 36 weeks of pregnancy, before or during RSV season (September through January).  If you decide not to get the vaccine, you can talk to your pediatrician about your baby receiving the RSV antibody injection Beyfortus after birth.

## 2024-07-12 ENCOUNTER — TELEPHONE (OUTPATIENT)
Dept: PERINATAL CARE | Facility: HOSPITAL | Age: 34
End: 2024-07-12

## 2024-07-12 NOTE — TELEPHONE ENCOUNTER
29w1d  Received BS log for date range 7/6-7/11     Low  High Out of Range   Fasting Blood Sugar 85 109 4/6   Post Breakfast 84 117 0/6   Post Lunch 88 125 1/6   Post Dinner 90 161 2/6     Patient is currently taking Toujeo 64 units at bedtime.   To Dr. Diehl for review.

## 2024-07-18 DIAGNOSIS — O24.419 GESTATIONAL DIABETES MELLITUS (GDM), ANTEPARTUM, GESTATIONAL DIABETES METHOD OF CONTROL UNSPECIFIED (HCC): ICD-10-CM

## 2024-07-18 RX ORDER — LANCETS 33 GAUGE
EACH MISCELLANEOUS
Qty: 100 EACH | Refills: 3 | Status: SHIPPED | OUTPATIENT
Start: 2024-07-18

## 2024-07-18 NOTE — TELEPHONE ENCOUNTER
30w0d   Patient has a ONETOUCH glucose monitor.  Refill request is for ONETOUCH lancets.  Refills sent as requested.  
stated

## 2024-07-19 ENCOUNTER — TELEPHONE (OUTPATIENT)
Dept: PERINATAL CARE | Facility: HOSPITAL | Age: 34
End: 2024-07-19

## 2024-07-19 RX ORDER — INSULIN GLARGINE 300 U/ML
INJECTION, SOLUTION SUBCUTANEOUS
Qty: 15 ML | Refills: 0 | Status: SHIPPED | OUTPATIENT
Start: 2024-07-19

## 2024-07-19 NOTE — TELEPHONE ENCOUNTER
Please adjust her insulin to the following:    Toujeo 10 units after breakfast  Toujeo 76 units at bedtime (divide this dose into two separate injections of 38 units each)    LUKAS

## 2024-07-19 NOTE — TELEPHONE ENCOUNTER
30w1d  Received BS log for date range 07/12/2024-07/18/2024     Low  High Out of Range   Fasting Blood Sugar 92 122 5/7   Post Breakfast 86 113 0/7   Post Lunch 101 128 3/7   Post Dinner 74 164 2/7     Patient is currently taking   Toujeo 70 units at bedtime.   To Dr. Joy for review.

## 2024-07-22 ENCOUNTER — ROUTINE PRENATAL (OUTPATIENT)
Dept: OBGYN CLINIC | Facility: CLINIC | Age: 34
End: 2024-07-22
Payer: COMMERCIAL

## 2024-07-22 VITALS
DIASTOLIC BLOOD PRESSURE: 70 MMHG | HEART RATE: 97 BPM | BODY MASS INDEX: 37 KG/M2 | SYSTOLIC BLOOD PRESSURE: 118 MMHG | WEIGHT: 213.5 LBS

## 2024-07-22 DIAGNOSIS — O09.93 SUPERVISION OF HIGH RISK PREGNANCY IN THIRD TRIMESTER (HCC): ICD-10-CM

## 2024-07-22 DIAGNOSIS — O24.414 INSULIN CONTROLLED GESTATIONAL DIABETES MELLITUS (GDM) IN SECOND TRIMESTER (HCC): Primary | ICD-10-CM

## 2024-07-22 PROCEDURE — 3078F DIAST BP <80 MM HG: CPT | Performed by: NURSE PRACTITIONER

## 2024-07-22 PROCEDURE — 3074F SYST BP LT 130 MM HG: CPT | Performed by: NURSE PRACTITIONER

## 2024-07-22 NOTE — PROGRESS NOTES
HARPREET  Doing well, no concerns  MFM is managing her insulin  GOOD FM  Denies VB/LOF/uctx  Rh positive  RTC in 2 wks with NST  Fetal movement instructions given

## 2024-07-25 ENCOUNTER — TELEPHONE (OUTPATIENT)
Dept: PERINATAL CARE | Facility: HOSPITAL | Age: 34
End: 2024-07-25

## 2024-08-02 ENCOUNTER — APPOINTMENT (OUTPATIENT)
Dept: OBGYN CLINIC | Facility: CLINIC | Age: 34
End: 2024-08-02
Payer: COMMERCIAL

## 2024-08-02 ENCOUNTER — ROUTINE PRENATAL (OUTPATIENT)
Dept: OBGYN CLINIC | Facility: CLINIC | Age: 34
End: 2024-08-02
Payer: COMMERCIAL

## 2024-08-02 VITALS
HEART RATE: 81 BPM | BODY MASS INDEX: 36.77 KG/M2 | SYSTOLIC BLOOD PRESSURE: 126 MMHG | DIASTOLIC BLOOD PRESSURE: 80 MMHG | HEIGHT: 64 IN | WEIGHT: 215.38 LBS

## 2024-08-02 DIAGNOSIS — O99.210 OBESITY AFFECTING PREGNANCY, ANTEPARTUM, UNSPECIFIED OBESITY TYPE (HCC): ICD-10-CM

## 2024-08-02 DIAGNOSIS — O24.414 INSULIN CONTROLLED GESTATIONAL DIABETES MELLITUS (GDM) IN SECOND TRIMESTER (HCC): ICD-10-CM

## 2024-08-02 DIAGNOSIS — Z3A.32 32 WEEKS GESTATION OF PREGNANCY (HCC): Primary | ICD-10-CM

## 2024-08-02 DIAGNOSIS — O09.529 ANTEPARTUM MULTIGRAVIDA OF ADVANCED MATERNAL AGE (HCC): ICD-10-CM

## 2024-08-02 PROCEDURE — 3074F SYST BP LT 130 MM HG: CPT | Performed by: NURSE PRACTITIONER

## 2024-08-02 PROCEDURE — 3008F BODY MASS INDEX DOCD: CPT | Performed by: NURSE PRACTITIONER

## 2024-08-02 PROCEDURE — 59025 FETAL NON-STRESS TEST: CPT | Performed by: NURSE PRACTITIONER

## 2024-08-02 PROCEDURE — 3079F DIAST BP 80-89 MM HG: CPT | Performed by: NURSE PRACTITIONER

## 2024-08-02 NOTE — PROGRESS NOTES
HARPREET 32w1d    Doing well, +FM  Denies contractions  Denies LOF, VB      FHT-NST reactive  PNL:  Negative HIV and T pallidum  Mode of delivery: anticipate    Immunizations: s/p TDAP  DM: sugars per MFM, weekly NST  Obesity/AMA: weekly NST   labor precautions reviewed  Fetal movement discussed    Return in 1 weeks

## 2024-08-05 ENCOUNTER — ULTRASOUND ENCOUNTER (OUTPATIENT)
Dept: PERINATAL CARE | Facility: HOSPITAL | Age: 34
End: 2024-08-05
Attending: OBSTETRICS & GYNECOLOGY
Payer: COMMERCIAL

## 2024-08-05 VITALS
HEART RATE: 92 BPM | SYSTOLIC BLOOD PRESSURE: 122 MMHG | WEIGHT: 216 LBS | HEIGHT: 64 IN | DIASTOLIC BLOOD PRESSURE: 73 MMHG | BODY MASS INDEX: 36.88 KG/M2

## 2024-08-05 DIAGNOSIS — O09.529 ANTEPARTUM MULTIGRAVIDA OF ADVANCED MATERNAL AGE (HCC): ICD-10-CM

## 2024-08-05 DIAGNOSIS — O24.414 INSULIN CONTROLLED GESTATIONAL DIABETES MELLITUS (GDM) IN THIRD TRIMESTER (HCC): ICD-10-CM

## 2024-08-05 DIAGNOSIS — O24.419 GDM (GESTATIONAL DIABETES MELLITUS) (HCC): ICD-10-CM

## 2024-08-05 DIAGNOSIS — O24.419 GDM (GESTATIONAL DIABETES MELLITUS) (HCC): Primary | ICD-10-CM

## 2024-08-05 PROCEDURE — 76819 FETAL BIOPHYS PROFIL W/O NST: CPT

## 2024-08-05 PROCEDURE — 76816 OB US FOLLOW-UP PER FETUS: CPT | Performed by: OBSTETRICS & GYNECOLOGY

## 2024-08-05 NOTE — PROGRESS NOTES
Outpatient Maternal-Fetal Medicine Consultation    Dear Dr. Marquez,    Thank you for requesting ultrasound evaluation and maternal fetal medicine consultation on your patient Luba Lane.  As you are aware she is a 34 year old female with a Cunningham pregnancy with Estimated Date of Delivery: 24 .  A maternal-fetal medicine consultation was requested secondary to type 2 diabetes.  Her prenatal records and labs were reviewed.    She had preeclampsia & GDM A1 in her prior pregnancy.    Now has fastings that are uncontrolled.    HISTORY  OB History    Para Term  AB Living   3 1 1 0 1 1   SAB IAB Ectopic Multiple Live Births   0 0 0 0 1     # 1 - Date: , Sex: None, Weight: None, GA: 7w0d, Type: None, Apgar1: None, Apgar5: None, Living: None, Birth Comments: None    # 2 - Date: 22, Sex: Male, Weight: 7 lb 11.5 oz (3.5 kg), GA: 37w2d, Type: Normal spontaneous vaginal delivery, Apgar1: 9, Apgar5: 9, Living: Living, Birth Comments: None    # 3 - Date: None, Sex: None, Weight: None, GA: None, Type: None, Apgar1: None, Apgar5: None, Living: None, Birth Comments: None    Past Medical History  The patient  has a past medical history of Anemia, Gestational diabetes (HCC), and Pregnancy-induced hypertension (HCC).    Past Surgical History  The patient  has a past surgical history that includes other (2017).    Family History  The patient She indicated that her mother is alive. She indicated that her father is alive.      Medications:   Current Outpatient Medications:     Insulin Glargine, 1 Unit Dial, (TOUJEO SOLOSTAR) 300 UNIT/ML Subcutaneous Solution Pen-injector, Inject 10 Units into the skin After Breakfast AND 76 Units nightly. Divide nightly dose into two separate injections of 38 units each., Disp: 15 mL, Rfl: 0    ferrous sulfate 325 (65 FE) MG Oral Tab EC, Take 1 tablet (325 mg total) by mouth daily with breakfast., Disp: , Rfl:     famotidine 10 MG Oral Tab, Take 1 tablet  (10 mg total) by mouth 2 (two) times daily., Disp: , Rfl:     aspirin 81 MG Oral Tab EC, Take 1 tablet (81 mg total) by mouth daily., Disp: , Rfl:     Prenatal 27-0.8 MG Oral Tab, Take 1 tablet by mouth daily., Disp: , Rfl:     Lancets (ONETOUCH DELICA PLUS [id number]) Does not apply Misc, USE TO TEST SUGAR FOUR TIMES DAILY, Disp: 100 each, Rfl: 3    BD PEN NEEDLE BERNARD 2ND GEN 32G X 4 MM Does not apply Misc, USE NEW NEEDLE WITH EACH INJECTION NIGHTLY, Disp: , Rfl:     Glucose Blood (BLOOD GLUCOSE TEST STRIPS 333) In Vitro Strip, 4 strips daily. Please dispense any brand that insurance will cover (patient using One Touch). Patient will check glucose 4 times daily. Fasting in the morning, 2 hours after breakfast, 2 hours after lunch, 2 hours after dinner., Disp: 100 strip, Rfl: 2    Blood Glucose Monitoring Suppl (ONETOUCH ULTRA 2) w/Device Does not apply Kit, 1 each 4 (four) times daily., Disp: , Rfl:   Allergies: No Known Allergies      PHYSICAL EXAMINATION:  /73 (BP Location: Right arm, Patient Position: Sitting, Cuff Size: large)   Pulse 92   Ht 5' 4\" (1.626 m)   Wt 216 lb (98 kg)   LMP 12/21/2023 (Exact Date)   BMI 37.08 kg/m²   General: alert and oriented,no acute distress  Abdomen: gravid, soft, non-tender        DISCUSSION  During her visit we discussed and reviewed the following issues:  GESTATIONAL DIABETES    3 hour GTT  Lab Results   Component Value Date    GLUFG 110 (H) 04/14/2024    GLU1G 202 (H) 04/14/2024    GLU2G 151 04/14/2024    GLU3G 156 (H) 04/14/2024     Probable Type 2 diabetes: I reviewed her glucose logs and she is in good control.    Medical Regimen Recommendation:   Continue ADA diet   Toujeo 10 units after breakfast  Toujeo 76 units at bedtime (divide this dose into two separate injections of 38 units each)    ADVANCED MATERNAL AGE    Background  I reviewed with the patient that pregnancies in women of advanced maternal age (35 or older at delivery) are associated with elevated  risks. Specifically, there is a higher rate of:  Fetal malformations  Preeclampsia  Gestational diabetes  Intrauterine fetal death   Please see previous MFM detailed discussion.       OBESITY:  Her BMI prior to pregnancy was 35.3   Please see previous MFM detailed discussion.     PRIOR PREECLAMPSIA  History:  The patient developed preeclampsia at 36 weeks and was induced at 37 weeks.  She was put on magnesium sulfate but did not need to go home on antihypertensive medication.      Please see previous MFM detailed discussion.     We discussed the recommended plan of care based on her  risk factors.  Luba and her significant other, Jerome, had their questions answered to their satisfaction.        OB ULTRASOUND REPORT   See imaging tab for complete ultrasound report or in PACS    Single IUP in cephalic presentation.    Placenta is posterior, fundal.   Cardiac activity is present at 138 bpm  EFW 1970 g ( 4 lb 5 oz); 43%.    TONY is  19.9 cm.  MVP is 7 cm  BPP is 8/8.         IMPRESSION:  IUP at 32w4d   Advanced maternal age, low risk cell free DNA screening.  Invasive testing declined   Obesity complicating pregnancy  Probable type 2 diabetes.  History of preeclampsia      RECOMMENDATIONS:  Continue care with Dr. Marquez  Monthly growth ultrasound with the addition of a BPP at each  Daily low-dose aspirin ( mg  daily)  Weekly nonstress test at 32 weeks, twice weekly at 34 weeks  Delivery is advised at 38 weeks  to 39 weeks   Check glucoses 4 times a day and upload to glucose flowsheet in my chart every 1 to 2 days for weekly MFM review  Insulin as above.    Total time spent 20 minutes this calendar day which includes preparing to see the patient including chart review, obtaining and/or reviewing additional medical history, performing a physical exam and evaluation, documenting clinical information in the electronic medical record, independently interpreting results, counseling the patient,  communicating results to the patient/family/caregiver and coordinating care.     Case discussed with patient who demonstrated understanding and agreement with plan.     Thank you for allowing me to participate in the care of this patient.  Please feel free to contact me with any questions.    Romain Suh D.O.  Maternal Fetal Medicine        Note to patient and family:  The 21st Century Cures Act makes medical notes available to patients in the interest of transparency.  However, please be advised that this is a medical document.  It is intended as a peer to peer communication.  It is written in medical language and may contain abbreviations or verbiage that are technical and unfamiliar.  It may appear blunt or direct.  Medical documents are intended to carry relevant information, facts as evident, and the clinical opinion of the practitioner.

## 2024-08-08 ENCOUNTER — PATIENT MESSAGE (OUTPATIENT)
Dept: PERINATAL CARE | Facility: HOSPITAL | Age: 34
End: 2024-08-08

## 2024-08-08 ENCOUNTER — TELEPHONE (OUTPATIENT)
Dept: PERINATAL CARE | Facility: HOSPITAL | Age: 34
End: 2024-08-08

## 2024-08-08 RX ORDER — INSULIN GLARGINE 300 U/ML
INJECTION, SOLUTION SUBCUTANEOUS
COMMUNITY
Start: 2024-08-08 | End: 2024-08-08

## 2024-08-08 RX ORDER — INSULIN GLARGINE 300 U/ML
INJECTION, SOLUTION SUBCUTANEOUS
Qty: 15 ML | Refills: 0 | Status: SHIPPED | OUTPATIENT
Start: 2024-08-08

## 2024-08-08 NOTE — TELEPHONE ENCOUNTER
33w0d  Received BS log for date range 8/1-8/8     Low  High Out of Range   Fasting Blood Sugar 77 98 2/7   Post Breakfast 102 118 0/7   Post Lunch 79 125 2/5   Post Dinner 93 152 6/7     Patient is currently taking:    Toujeo 10 units after breakfast  Toujeo 76 units HS (split dose)     To Dr. Diehl for review.

## 2024-08-08 NOTE — TELEPHONE ENCOUNTER
New regimen:    Toujeo 14 units after breakfast  Toujeo 80 units HS (split dose)    Adan Diehl M.D.  Maternal-Fetal Medicine

## 2024-08-12 ENCOUNTER — HOSPITAL ENCOUNTER (OUTPATIENT)
Facility: HOSPITAL | Age: 34
Discharge: HOME | End: 2024-08-12
Attending: OBSTETRICS & GYNECOLOGY | Admitting: OBSTETRICS & GYNECOLOGY
Payer: COMMERCIAL

## 2024-08-12 ENCOUNTER — APPOINTMENT (OUTPATIENT)
Dept: ULTRASOUND IMAGING | Facility: HOSPITAL | Age: 34
End: 2024-08-12
Attending: OBSTETRICS & GYNECOLOGY
Payer: COMMERCIAL

## 2024-08-12 ENCOUNTER — NURSE ONLY (OUTPATIENT)
Dept: OBGYN CLINIC | Facility: CLINIC | Age: 34
End: 2024-08-12
Payer: COMMERCIAL

## 2024-08-12 VITALS
WEIGHT: 217.13 LBS | DIASTOLIC BLOOD PRESSURE: 76 MMHG | BODY MASS INDEX: 37 KG/M2 | SYSTOLIC BLOOD PRESSURE: 120 MMHG | HEART RATE: 83 BPM

## 2024-08-12 VITALS
TEMPERATURE: 98 F | HEIGHT: 64 IN | HEART RATE: 95 BPM | WEIGHT: 217 LBS | DIASTOLIC BLOOD PRESSURE: 84 MMHG | SYSTOLIC BLOOD PRESSURE: 129 MMHG | RESPIRATION RATE: 18 BRPM | BODY MASS INDEX: 37.05 KG/M2

## 2024-08-12 DIAGNOSIS — O24.414 INSULIN CONTROLLED GESTATIONAL DIABETES MELLITUS (GDM) IN SECOND TRIMESTER (HCC): Primary | ICD-10-CM

## 2024-08-12 PROBLEM — O36.8390 ANTEPARTUM VARIABLE DECELERATION (HCC): Status: ACTIVE | Noted: 2024-08-12

## 2024-08-12 PROCEDURE — 59025 FETAL NON-STRESS TEST: CPT | Performed by: NURSE PRACTITIONER

## 2024-08-12 PROCEDURE — 3078F DIAST BP <80 MM HG: CPT | Performed by: NURSE PRACTITIONER

## 2024-08-12 PROCEDURE — 59025 FETAL NON-STRESS TEST: CPT | Performed by: OBSTETRICS & GYNECOLOGY

## 2024-08-12 PROCEDURE — 3079F DIAST BP 80-89 MM HG: CPT | Performed by: OBSTETRICS & GYNECOLOGY

## 2024-08-12 PROCEDURE — 3074F SYST BP LT 130 MM HG: CPT | Performed by: NURSE PRACTITIONER

## 2024-08-12 PROCEDURE — 76819 FETAL BIOPHYS PROFIL W/O NST: CPT | Performed by: OBSTETRICS & GYNECOLOGY

## 2024-08-12 PROCEDURE — 3008F BODY MASS INDEX DOCD: CPT | Performed by: OBSTETRICS & GYNECOLOGY

## 2024-08-12 PROCEDURE — 3074F SYST BP LT 130 MM HG: CPT | Performed by: OBSTETRICS & GYNECOLOGY

## 2024-08-12 PROCEDURE — 76818 FETAL BIOPHYS PROFILE W/NST: CPT | Performed by: OBSTETRICS & GYNECOLOGY

## 2024-08-12 NOTE — PROGRESS NOTES
Pt is a 34 year old female admitted to TRG3/TRG3-A.     Chief Complaint   Patient presents with    Non-stress Test     And BPP.  Patient had variable decel on NST at the office today.  Denies any labor complaints.  + FM      Pt is  33w4d intra-uterine pregnancy.  History obtained, consents signed. Oriented to room, staff, and plan of care.

## 2024-08-12 NOTE — NST
Nonstress Test   Patient: Luba Lane    Gestation: 33w4d    NST:       Variability: Moderate           Accelerations: Yes           Decelerations: None            Baseline: 140 BPM           Uterine Irritability: No           Contractions: Not present                                        Acoustic Stimulator: No           Nonstress Test Interpretation: Reactive                                 Additional Comments Comments: Reviewed by Dr. Hale

## 2024-08-12 NOTE — PROGRESS NOTES
NST Reactive  moderate variability, baseline 140, + accels, no decels  no contractions  BPP 8/8    Continue NST weekly   NST 2/2 variable deceleration in office    Geetha Hale MD   [facility] - OBGYN

## 2024-08-12 NOTE — PROGRESS NOTES
Discharged to home per ambulatory in stable condition with written and verbal instructions. Patient not in active labor.  Patient verbalizes understanding of information given.

## 2024-08-19 ENCOUNTER — OFFICE VISIT (OUTPATIENT)
Dept: PERINATAL CARE | Facility: HOSPITAL | Age: 34
End: 2024-08-19
Attending: OBSTETRICS & GYNECOLOGY
Payer: COMMERCIAL

## 2024-08-19 ENCOUNTER — TELEPHONE (OUTPATIENT)
Dept: OBGYN CLINIC | Facility: CLINIC | Age: 34
End: 2024-08-19

## 2024-08-19 ENCOUNTER — ROUTINE PRENATAL (OUTPATIENT)
Dept: OBGYN CLINIC | Facility: CLINIC | Age: 34
End: 2024-08-19
Payer: COMMERCIAL

## 2024-08-19 ENCOUNTER — APPOINTMENT (OUTPATIENT)
Dept: OBGYN CLINIC | Facility: CLINIC | Age: 34
End: 2024-08-19
Payer: COMMERCIAL

## 2024-08-19 VITALS
WEIGHT: 219.19 LBS | BODY MASS INDEX: 38 KG/M2 | HEART RATE: 106 BPM | SYSTOLIC BLOOD PRESSURE: 122 MMHG | DIASTOLIC BLOOD PRESSURE: 74 MMHG

## 2024-08-19 DIAGNOSIS — O09.529 ANTEPARTUM MULTIGRAVIDA OF ADVANCED MATERNAL AGE (HCC): ICD-10-CM

## 2024-08-19 DIAGNOSIS — O24.414 INSULIN CONTROLLED GESTATIONAL DIABETES MELLITUS (GDM) IN THIRD TRIMESTER (HCC): ICD-10-CM

## 2024-08-19 DIAGNOSIS — O24.419 GDM (GESTATIONAL DIABETES MELLITUS) (HCC): ICD-10-CM

## 2024-08-19 DIAGNOSIS — O24.414 INSULIN CONTROLLED GESTATIONAL DIABETES MELLITUS (GDM) IN SECOND TRIMESTER (HCC): Primary | ICD-10-CM

## 2024-08-19 DIAGNOSIS — O09.93 SUPERVISION OF HIGH RISK PREGNANCY IN THIRD TRIMESTER (HCC): ICD-10-CM

## 2024-08-19 DIAGNOSIS — O28.8 NON-STRESS TEST WITH DECELERATIONS: ICD-10-CM

## 2024-08-19 DIAGNOSIS — O24.419 GDM (GESTATIONAL DIABETES MELLITUS) (HCC): Primary | ICD-10-CM

## 2024-08-19 PROCEDURE — 76819 FETAL BIOPHYS PROFIL W/O NST: CPT | Performed by: OBSTETRICS & GYNECOLOGY

## 2024-08-19 PROCEDURE — 76815 OB US LIMITED FETUS(S): CPT

## 2024-08-19 NOTE — PROGRESS NOTES
HARPREET  Doing well, no concerns or questions  GOOD FM  Denies VB/LOF/uctx  Will send request for IOL with Cytotec  RTC for twice weekly NST, weekly HARPREET  Fetal movement discussed  NST reactive but with variable deceleration- sent to [facility] [place] for BPP

## 2024-08-19 NOTE — PROGRESS NOTES
OB ULTRASOUND REPORT   See imaging tab for complete ultrasound report or in PACS    Single IUP in cephalic presentation.    Placenta is posterior, fundal.   Cardiac activity is present at 143 bpm  MVP is 6.2 cm . TONY 16.7 cm      BIOPHYSICAL PROFILE:  Movement:    2/2  Tone:            2/2  Breathin/2  Fluid:             2/2  TOTAL:               Romain Suh D.O.  Maternal Fetal Medicine

## 2024-08-19 NOTE — TELEPHONE ENCOUNTER
----- Message from Megan Dominique sent at 8/19/2024 10:30 AM CDT -----  Patient to have Cytotec with IOL 38-39 weeks please

## 2024-08-22 ENCOUNTER — TELEPHONE (OUTPATIENT)
Dept: PERINATAL CARE | Facility: HOSPITAL | Age: 34
End: 2024-08-22

## 2024-08-22 ENCOUNTER — PATIENT MESSAGE (OUTPATIENT)
Dept: PERINATAL CARE | Facility: HOSPITAL | Age: 34
End: 2024-08-22

## 2024-08-22 NOTE — TELEPHONE ENCOUNTER
NO CHANGE:    Toujeo 14 units after breakfast  Toujeo 80 units HS (split dose)    Adan Diehl M.D.  Maternal-Fetal Medicine

## 2024-08-22 NOTE — TELEPHONE ENCOUNTER
35w0d  Received BS log for date range 8/15-8/22     Low  High Out of Range   Fasting Blood Sugar 69 82 0/7   Post Breakfast 78 151 1/7   Post Lunch 71 131 3/7   Post Dinner 87 114 0/7     Scheduled for cytotec iol 9/12.    Patient is currently taking:    Toujeo 14 units after breakfast  Toujeo 80 units HS (split dose)     To Dr. Diehl for review.

## 2024-08-26 ENCOUNTER — NURSE ONLY (OUTPATIENT)
Dept: OBGYN CLINIC | Facility: CLINIC | Age: 34
End: 2024-08-26
Payer: COMMERCIAL

## 2024-08-26 VITALS
WEIGHT: 220 LBS | SYSTOLIC BLOOD PRESSURE: 122 MMHG | DIASTOLIC BLOOD PRESSURE: 70 MMHG | HEART RATE: 92 BPM | BODY MASS INDEX: 38 KG/M2

## 2024-08-26 DIAGNOSIS — O09.529 ANTEPARTUM MULTIGRAVIDA OF ADVANCED MATERNAL AGE (HCC): Primary | ICD-10-CM

## 2024-08-26 DIAGNOSIS — O24.414 INSULIN CONTROLLED GESTATIONAL DIABETES MELLITUS (GDM) IN SECOND TRIMESTER (HCC): ICD-10-CM

## 2024-08-26 PROCEDURE — 59025 FETAL NON-STRESS TEST: CPT | Performed by: NURSE PRACTITIONER

## 2024-08-26 PROCEDURE — 3078F DIAST BP <80 MM HG: CPT | Performed by: NURSE PRACTITIONER

## 2024-08-26 PROCEDURE — 3074F SYST BP LT 130 MM HG: CPT | Performed by: NURSE PRACTITIONER

## 2024-08-30 ENCOUNTER — ROUTINE PRENATAL (OUTPATIENT)
Dept: OBGYN CLINIC | Facility: CLINIC | Age: 34
End: 2024-08-30
Payer: COMMERCIAL

## 2024-08-30 ENCOUNTER — ORDERS FOR ADMISSION (OUTPATIENT)
Dept: PERINATAL CARE | Facility: HOSPITAL | Age: 34
End: 2024-08-30

## 2024-08-30 ENCOUNTER — APPOINTMENT (OUTPATIENT)
Dept: OBGYN CLINIC | Facility: CLINIC | Age: 34
End: 2024-08-30
Payer: COMMERCIAL

## 2024-08-30 VITALS — DIASTOLIC BLOOD PRESSURE: 84 MMHG | WEIGHT: 221 LBS | BODY MASS INDEX: 38 KG/M2 | SYSTOLIC BLOOD PRESSURE: 114 MMHG

## 2024-08-30 DIAGNOSIS — Z36.9 ANTENATAL SCREENING ENCOUNTER (HCC): Primary | ICD-10-CM

## 2024-08-30 PROBLEM — R82.71 GBS BACTERIURIA: Status: ACTIVE | Noted: 2024-08-30

## 2024-08-30 RX ORDER — SODIUM CHLORIDE, SODIUM LACTATE, POTASSIUM CHLORIDE, CALCIUM CHLORIDE 600; 310; 30; 20 MG/100ML; MG/100ML; MG/100ML; MG/100ML
INJECTION, SOLUTION INTRAVENOUS CONTINUOUS PRN
OUTPATIENT
Start: 2024-08-30

## 2024-08-30 RX ORDER — INSULIN DEGLUDEC 100 U/ML
60 INJECTION, SOLUTION SUBCUTANEOUS ONCE
OUTPATIENT
Start: 2024-09-12

## 2024-08-30 RX ORDER — NICOTINE POLACRILEX 4 MG
30 LOZENGE BUCCAL
OUTPATIENT
Start: 2024-08-30

## 2024-08-30 RX ORDER — DEXTROSE MONOHYDRATE 25 G/50ML
50 INJECTION, SOLUTION INTRAVENOUS
OUTPATIENT
Start: 2024-08-30

## 2024-08-30 RX ORDER — NICOTINE POLACRILEX 4 MG
15 LOZENGE BUCCAL
OUTPATIENT
Start: 2024-08-30

## 2024-08-30 RX ORDER — SODIUM CHLORIDE 9 MG/ML
25 INJECTION, SOLUTION INTRAVENOUS CONTINUOUS PRN
OUTPATIENT
Start: 2024-08-30

## 2024-08-30 RX ORDER — DEXTROSE, SODIUM CHLORIDE, SODIUM LACTATE, POTASSIUM CHLORIDE, AND CALCIUM CHLORIDE 5; .6; .31; .03; .02 G/100ML; G/100ML; G/100ML; G/100ML; G/100ML
50 INJECTION, SOLUTION INTRAVENOUS CONTINUOUS PRN
OUTPATIENT
Start: 2024-08-30

## 2024-08-30 NOTE — PROGRESS NOTES
RETURN OB 35-41 WGA      GA: 36w1d  Vitals:    24 1423   BP: 114/84   Weight: 221 lb (100.2 kg)       Doing well, +FM  Denies LOF/VB/uctx  Mode of delivery:   anticipated  SVE deferred   GBS bacturia  Fetal movement count given  Labor precautions provided       Patient Active Problem List    Diagnosis    GBS bacteriuria     Urine culture       Antepartum variable deceleration (HCC)    Pregnancy, supervision, high-risk ([facility])     Low dose ASA  [X] MFM w/ level 2 US - nl  [X] fetal echo - nl  Monthly growth ultrasound in the third trimester with the addition of a BPP at each growth assessment 32 weeks and beyond  7/8  Daily low-dose aspirin ( mg  daily)  Weekly nonstress test at 32 weeks, twice weekly at 34 weeks  Delivery is advised at 38+0 to 39+6      History of pre-eclampsia     Low dose ASA  [ x] baseline labs   [ x] PCR      History of gestational diabetes     [x ] early 1 hr GTT abnormal  [ X] 3 hr GTT abnormal      Antepartum multigravida of advanced maternal age ([facility])             Obesity affecting pregnancy, antepartum ([facility])     [ x] early 1 hr GTT - abnl  [ ] MFM       Insulin controlled gestational diabetes mellitus (GDM) in second trimester ([facility])     [x] diabetic education   [ ] glucose QID  21wks started insulin with MFM  Monthly growth ultrasound with the addition of a BPP at each growth assessment 32 weeks and beyond; 43% EFW at 32 wks  Daily low-dose aspirin ( mg  daily)  Weekly nonstress test at 32 weeks, twice weekly at 34 weeks  Delivery is advised at 38 weeks 0 days to 39 weeks   Check glucoses 4 times a day and upload to glucose flowsheet in my chart every 1 to 2 days for weekly MFM review      Cervical high risk human papillomavirus (HPV) 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  Patient declines and will defer to  postpartum             RTC 1 week    NST  reactive and reassuring           Note to patient and family   The 21st Century Cures Act makes medical notes available to patients in the interest of transparency.  However, please be advised that this is a medical document.  It is intended as ntto-le-rkzy communication.  It is written and medical language may contain abbreviations or verbiage that are technical and unfamiliar.  It may appear blunt or direct.  Medical documents are intended to carry relevant information, facts as evident, and the clinical opinion of the practitioner.    Heron Marquez MD

## 2024-08-30 NOTE — PATIENT INSTRUCTIONS
KICK COUNT INSTRUCTIONS    After 28 weeks of pregnancy, we would like for you to monitor your baby’s movement daily by doing kick counts, an easy way for you to assess your baby’s well-being.    How to count kicks:  Choose a time when the baby is active, such as after a meal.  Sit comfortably or lie on your side, and count the number of movements in an hour… you should feel 10 movements in 2 hours    The evening hours seem to be the most convenient time to do this test, although you can also do this test anytime you are concerned about the baby’s movement.    Call the office right away at [phone] if you notice any of the following:  Your baby moves less than 10 times in 2 hours while you are doing kick counts.  Your baby moves much less often than on the days before.  You have not felt your baby move all day. Labor Instructions    How do I know if it’s true labor?  One of the most important aspects of any pregnancy is being able to recognize the onset of labor.  Unfortunately, on occasion it can be difficult or confusing, especially if you have had one or more children.  Each labor can begin in a different way even if you have had four or five children.    If this is your first child, it is very common to have labor for an average greater than 10 hours; however, there have been rare instances for labor to be two hours or less for a first time mother. It is more important for you to know if this is your second or third baby to realize that any labor after the first is usually shorter.  There is no way to tell how long or short the labor will be. Therefore, please call us if you are unsure labor has started.       Usually, during the last six weeks of pregnancy, [place] contractions or “false labor pains occur”.  False labor is generally not very painful though it is not always easy to tell.  You may feel contractions, cramps or uterine tightening somewhere between every 3-30 minutes but they will not  continue to get stronger over time.  If you lie down, drink plenty of fluid or walk around, the contractions may go away.   False contractions are very common if you have been active on your feet for several hours.   Women frequently worry about being sent home from the hospital without having their baby (i.e. the labor stopped).  Actually, this is an unnecessary worry, for this is an infrequent occurrence.     True labor usually begins in one of two ways.  In most patients it begins with contractions of the uterus, which are irregular (but not always) in the beginning.  They are cramp-like in character and feel similar to menstrual cramps.  After a while, they become more regular, and they seem to last a little longer, and feel a little sharper.  These symptoms are very important-more important- than the timing of the contractions.  Having regular (usually closer), longer lasting (35-70 seconds), and sharper (more painful) contractions are the common symptoms of actual labor.  The second way in which labor can begin which occurs in approximately 30% of all patients is the rupture of the bag of water.  This is a sudden gush of watery fluid, usually sufficient to run down your leg and onto the floor, or you may wet a large area of the bed if it happens at night.  There may also be tricking that is uncontrolled.  If you are unsure, please call the office.      When should I head to the hospital?  When contractions are strong and every 5 minutes for one hour.  If you have a gush of water or you think you might be leaking fluid.  If you are bleeding heavily.  If your baby is not moving around at least every 1-2 hours.  If you are worried about something.  When you think you are ready to go to the hospital.    Who do I call with concerns?  Call the office at [phone].  If the office is closed, the answering service will send a message to the physician on call.  The on call physician will be available for emergency  phone calls only.          Can I eat in labor?  It is good to eat a light meal at home before going to the hospital.  Eat foods like crackers, popsicles, soup and fruit.  Avoid foods that are difficult to digest like meat, a lot of dairy products and high fat foods.  DO NOT EAT if you know you are scheduled for a  ().      What will happen when I get to the hospital?  You are going to [facility] in [facility].  After 8 pm, you will need to go through the Emergency Department.  When you arrive at the hospital, you will be admitted and examined.   There are a few factors that will determine if you will be allowed to be up out of bed or if you would need to stay in bed.  The main factors are how well the baby is entering into the pelvis and if the bag of unger is in intact or ruptured.  An intravenous (IV) solution will, with exceptions, be started.  This is extremely important especially for the baby.   Your  will be allowed in the room during your labor. During the delivery, the nurses will inform you of the hospital policy and how many coaches are allowed.  You may desire pain medication or anesthesia for pain.  You probably discussed some aspects of pain medication with us during your prenatal care.  The various options may also have been discussed in Prenatal Classes.  We utilize IV narcotics and epidural anesthesia when our patients request to have them.  If you chose to have no anesthesia, none will be administered.  A local anesthetic may be used at the time of delivery      What should I to bring to the hospital?  Maternity clothes to go home in  You can bring your own night gown to wear after giving birth, but most women prefer to wear the hospital gown because it may get soiled  Nursing Bra if you are planning to breastfeed  Clothes for your baby to go home in  Baby Car Seat.  Be sure you know how to install it correctly. Please install it before going to the  hospital  Routine toiletries like toothbrush, shampoo, hairbrush and etc.   You can bring your favorite pillow, but please put a colored pillow case on it so it doesn’t get mixed up with hospital pillows    How long will I stay in the hospital?  The date you leave the hospital may vary depending on the speed of your recovery.  If you have a vaginal delivery, you will stay in the hospital 24-48 hours after your delivery as long as you aren’t having any complications.    If you have had a , you will stay in the hospital 48-72 hours as long as you aren’t having any complications.       Going Home Instructions  There are no set rules as to what you may do each day or week after you are home.  You will receive discharge instructions to help you each day.  Remember, early ambulation in the hospital is to prevent complications.  Do not let this lull you into a false sense of strength or ability to do certain physical acts which may tire you excessively.  Please call the office within a few days after you are discharged from the hospital to schedule your post-partum visit, which is usually 4-6 weeks after delivery.    Any medications necessary will be discussed on an individual basis.  If you decide to breastfeed your baby, you should continue your prenatal vitamins.  If you do not breastfeed, simply finish the prenatal vitamins you have.      The staff at [facility] wish you a joyous and exciting birth.  If there is anything we can do to make this a better experience for you please do not hesitate to ask.

## 2024-09-01 LAB — STREP GP B CULT OB: POSITIVE

## 2024-09-03 ENCOUNTER — OFFICE VISIT (OUTPATIENT)
Dept: PERINATAL CARE | Facility: HOSPITAL | Age: 34
End: 2024-09-03
Attending: OBSTETRICS & GYNECOLOGY
Payer: COMMERCIAL

## 2024-09-03 ENCOUNTER — TELEPHONE (OUTPATIENT)
Dept: OBGYN UNIT | Facility: HOSPITAL | Age: 34
End: 2024-09-03

## 2024-09-03 VITALS
HEIGHT: 64 IN | BODY MASS INDEX: 38.07 KG/M2 | SYSTOLIC BLOOD PRESSURE: 131 MMHG | DIASTOLIC BLOOD PRESSURE: 84 MMHG | HEART RATE: 91 BPM | WEIGHT: 223 LBS

## 2024-09-03 DIAGNOSIS — O24.419 GDM (GESTATIONAL DIABETES MELLITUS) (HCC): ICD-10-CM

## 2024-09-03 DIAGNOSIS — O99.210 OBESITY AFFECTING PREGNANCY (HCC): ICD-10-CM

## 2024-09-03 DIAGNOSIS — E11.9 TYPE 2 DIABETES MELLITUS WITHOUT COMPLICATION, WITHOUT LONG-TERM CURRENT USE OF INSULIN (HCC): ICD-10-CM

## 2024-09-03 DIAGNOSIS — O09.529 ANTEPARTUM MULTIGRAVIDA OF ADVANCED MATERNAL AGE (HCC): Primary | ICD-10-CM

## 2024-09-03 DIAGNOSIS — O99.213 OTHER OBESITY DUE TO EXCESS CALORIES AFFECTING PREGNANCY IN THIRD TRIMESTER (HCC): ICD-10-CM

## 2024-09-03 DIAGNOSIS — O24.112 PRE-EXISTING TYPE 2 DIABETES MELLITUS DURING PREGNANCY IN SECOND TRIMESTER (HCC): ICD-10-CM

## 2024-09-03 DIAGNOSIS — O09.529 ANTEPARTUM MULTIGRAVIDA OF ADVANCED MATERNAL AGE (HCC): ICD-10-CM

## 2024-09-03 DIAGNOSIS — E66.09 OTHER OBESITY DUE TO EXCESS CALORIES AFFECTING PREGNANCY IN THIRD TRIMESTER (HCC): ICD-10-CM

## 2024-09-03 DIAGNOSIS — O24.414 INSULIN CONTROLLED GESTATIONAL DIABETES MELLITUS (GDM) IN THIRD TRIMESTER (HCC): ICD-10-CM

## 2024-09-03 PROCEDURE — 76816 OB US FOLLOW-UP PER FETUS: CPT | Performed by: OBSTETRICS & GYNECOLOGY

## 2024-09-03 PROCEDURE — 76819 FETAL BIOPHYS PROFIL W/O NST: CPT

## 2024-09-03 NOTE — PROGRESS NOTES
Outpatient Maternal-Fetal Medicine Follow-Up     Dear Dr. Marquez,     Thank you for requesting ultrasound evaluation and maternal fetal medicine consultation on your patient Luba Lane.  As you are aware she is a 34 year old female  with a Cunningham pregnancy and an Estimated Date of Delivery: 24.  She returned to maternal-fetal medicine today for a follow-up visit.  Her history was reviewed from her prior visit and there were no interval changes.     Antepartum Risk Factors  GDMA2 versus probable type 2 diabetes  History of preeclampsia  Class II obesity complicating pregnancy  Advanced maternal age    S: She reports good fetal movement.  She has no concerns or complaints  PHYSICAL EXAMINATION:  LMP 2023 (Exact Date)   General: alert and oriented, no acute distress  Abdomen: gravid, soft, non-tender  Extremities: non-tender, no edema     OBSTETRIC ULTRASOUND  The patient had a follow-up fetal growth and BPP ultrasound today which I interpreted the results and reviewed them with the patient.    Ultrasound Findings:  Single IUP in cephalic presentation.    Placenta is posterior.   Cardiac activity is present at 148 bpm  EFW 2761 g ( 6 lb 1 oz); 31%.    TONY is  17.6 cm.  MVP is 6.3 cm  BPP is 8/8.     The fetal measurements are consistent with established EDC. No gross ultrasound evidence of structural abnormalities are seen today. The patient understands that ultrasound cannot rule out all structural and chromosomal abnormalities.     See imaging tab for the complete US report.     DISCUSSION  During her visit we discussed and reviewed the following issues:  GESTATIONAL DIABETES -follow-up  We reviewed the potential implications and risks associated with GDM to her and her fetus, especially when poorly controlled. We discussed the increased incidence of macrosomia and the potential for related birth injury to her and her baby. We talked about the increase risks associated risk of  fetal hyperinsulinemia, jaundice, electrolyte imbalance, seizure activity, IUFD and other adverse obstetric outcomes. We also reviewed her  increased risk of having diabetes later in life. The importance of good glycemic control and avoidance of prolonged hypoglycemia and hyperglycemia was addressed.  See MFM note from 5/10/2024 for detailed review    Her capillary blood glucose records were reviewed today as she has only been testing a few days and did not bring her log; her compliance with the recommended four times daily assessments (fasting and two-hour post-prandial) is  good .   Her overall glucose control is adequate     The patient is presently on diet therapy; her compliance with her diabetic diet regimen was reviewed and it is good.         Medical Regimen Recommendation:   Continue ADA diet & monitoring   Glargine insulin 14 units in the morning and 80 units nightly (split into 2 separate injections of 40 units each)        ADVANCED MATERNAL AGE  See MFM note from 5/10/2024 for detailed review  I reviewed with the patient that pregnancies in women of advanced maternal age (35 or older at delivery) are associated with elevated risks. Specifically, there is a higher rate of:  Fetal malformations  Preeclampsia  Gestational diabetes  Intrauterine fetal death     As a result, enhanced pregnancy surveillance is advised for these patients including a comprehensive ultrasound to assess for fetal malformations (at 20 weeks) and a third trimester ultrasound assessment for fetal growth (at 32 weeks). In addition, weekly NST's (initiating at 36 weeks gestation for women 35-39 years and at 32 weeks gestation for women 40 years and older) are also advised. Routine obstetric care is more than adequate to assess for gestational diabetes and preeclampsia; hence, no further significant alterations in obstetric care are advised.          OBESITY:  Her BMI prior to pregnancy was 35.3  Obesity during pregnancy is associated  with numerous maternal and  risks which were discussed previously and reviewed.  See MFM note from 5/10/2024 for detailed review.     PRIOR PREECLAMPSIA  History:  The patient developed preeclampsia at 36 weeks and was induced at 37 weeks.  She was put on magnesium sulfate but did not need to go home on antihypertensive medication.     In light of her history, I previously advised that she take low-dose aspirin daily.  She is currently taking 81 mg once daily.  We compared and contrasted 81 versus 162 mg daily.  See MFM note from 5 10/20/2024 for detailed review.        We discussed the recommended plan of care based on her  risk factors.  Luba and her significant other, Jerome, had their questions answered to their satisfaction.        IMPRESSION:  IUP at 36w5d  Fetal measurements are consistent with dates; reassuring  testing  GDM A2  History of preeclampsia  Advanced maternal age  Class II obesity     RECOMMENDATIONS:  Continue care with Dr. Marquez  Twice weekly NST  Delivery is advised at 38 weeks  Continue GDM diet, blood sugar monitoring and insulin as noted above     Total time spent 30 minutes this calendar day which includes preparing to see the patient including chart review, obtaining and/or reviewing additional medical history, performing a physical exam and evaluation, documenting clinical information in the electronic medical record, independently interpreting results, counseling the patient, communicating results to the patient/family/caregiver and coordinating care.     Case discussed with patient who demonstrated understanding and agreement with plan.     Thank you for allowing me to participate in the care of this patient.  Please feel free to contact me with any questions.    Elda Joy MD  Maternal-Fetal Medicine       Note to patient and family:  The 21st Century Cures Act makes medical notes available to patients in the interest of transparency.  However,  please be advised that this is a medical document.  It is intended as a peer to peer communication.  It is written in medical language and may contain abbreviations or verbiage that are technical and unfamiliar.  It may appear blunt or direct.  Medical documents are intended to carry relevant information, facts as evident, and the clinical opinion of the practitioner.

## 2024-09-06 ENCOUNTER — APPOINTMENT (OUTPATIENT)
Dept: OBGYN CLINIC | Facility: CLINIC | Age: 34
End: 2024-09-06
Payer: COMMERCIAL

## 2024-09-06 ENCOUNTER — ROUTINE PRENATAL (OUTPATIENT)
Dept: OBGYN CLINIC | Facility: CLINIC | Age: 34
End: 2024-09-06
Payer: COMMERCIAL

## 2024-09-06 VITALS
BODY MASS INDEX: 38.65 KG/M2 | HEART RATE: 81 BPM | DIASTOLIC BLOOD PRESSURE: 84 MMHG | SYSTOLIC BLOOD PRESSURE: 116 MMHG | WEIGHT: 226.38 LBS | HEIGHT: 64 IN

## 2024-09-06 DIAGNOSIS — Z3A.37 37 WEEKS GESTATION OF PREGNANCY (HCC): Primary | ICD-10-CM

## 2024-09-06 PROCEDURE — 59025 FETAL NON-STRESS TEST: CPT | Performed by: STUDENT IN AN ORGANIZED HEALTH CARE EDUCATION/TRAINING PROGRAM

## 2024-09-06 PROCEDURE — 3008F BODY MASS INDEX DOCD: CPT | Performed by: STUDENT IN AN ORGANIZED HEALTH CARE EDUCATION/TRAINING PROGRAM

## 2024-09-06 PROCEDURE — 3079F DIAST BP 80-89 MM HG: CPT | Performed by: STUDENT IN AN ORGANIZED HEALTH CARE EDUCATION/TRAINING PROGRAM

## 2024-09-06 PROCEDURE — 3074F SYST BP LT 130 MM HG: CPT | Performed by: STUDENT IN AN ORGANIZED HEALTH CARE EDUCATION/TRAINING PROGRAM

## 2024-09-06 NOTE — PROGRESS NOTES
RETURN OB 35-41 WGA      GA: 37w1d  Vitals:    24 1357   BP: 116/84   Pulse: 81   Weight: 226 lb 6.4 oz (102.7 kg)   Height: 64\"       Doing well, +FM  Denies LOF/VB/uctx  Mode of delivery:   anticipated  SVE 2/50/-3   GBS positive in urine  Fetal movement count given  Labor precautions provided   Cephalic     Patient Active Problem List    Diagnosis    GBS bacteriuria     Urine culture       Pregnancy, supervision, high-risk (HCC)     Low dose ASA  [X] MFM w/ level 2 US - nl  [X] fetal echo - nl  Monthly growth ultrasound in the third trimester with the addition of a BPP at each growth assessment 32 weeks and beyond  Daily low-dose aspirin ( mg  daily)  Weekly nonstress test at 32 weeks, twice weekly at 34 weeks  Delivery is advised at 38+0 to 39+6      History of pre-eclampsia     Low dose ASA  [ x] baseline labs   [ x] PCR      History of gestational diabetes     [x ] early 1 hr GTT abnormal  [ X] 3 hr GTT abnormal      Antepartum multigravida of advanced maternal age ([facility])             Obesity affecting pregnancy, antepartum ([facility])     [ x] early 1 hr GTT - abnl  [X ] MFM       Insulin controlled gestational diabetes mellitus (GDM) in second trimester ([facility])     [x] diabetic education   [ ] glucose QID  21wks started insulin with MFM  Monthly growth ultrasound with the addition of a BPP at each growth assessment 32 weeks and beyond; 43% EFW at 32 wks; EFW 2761 g ( 6 lb 1 oz); 31% @ 36wga  Daily low-dose aspirin ( mg  daily)  Weekly nonstress test at 32 weeks, twice weekly at 34 weeks  Delivery is advised at 38 weeks 0 days to 39 weeks   Check glucoses 4 times a day and upload to glucose flowsheet in my chart every 1 to 2 days for weekly MFM review      Cervical high risk human papillomavirus (HPV) DNA test positive     HPV + but Neg 16/18/45, NILM  --> Repeat cotesting in 1 year: NILM, HPV+ but neg 16/18/45 2024  [ ] colposcopy - reviewed and d/w patient , recommend to  schedule.  Patient declines and will defer to postpartum             RTC 1 week    NST  reactive and reassuring           Note to patient and family   The 21st Century Cures Act makes medical notes available to patients in the interest of transparency.  However, please be advised that this is a medical document.  It is intended as fjpw-in-jgde communication.  It is written and medical language may contain abbreviations or verbiage that are technical and unfamiliar.  It may appear blunt or direct.  Medical documents are intended to carry relevant information, facts as evident, and the clinical opinion of the practitioner.    Heron Marquez MD

## 2024-09-07 PROBLEM — O36.8390 ANTEPARTUM VARIABLE DECELERATION (HCC): Status: RESOLVED | Noted: 2024-08-12 | Resolved: 2024-09-07

## 2024-09-07 NOTE — PATIENT INSTRUCTIONS
Labor Instructions    How do I know if it’s true labor?  One of the most important aspects of any pregnancy is being able to recognize the onset of labor.  Unfortunately, on occasion it can be difficult or confusing, especially if you have had one or more children.  Each labor can begin in a different way even if you have had four or five children.    If this is your first child, it is very common to have labor for an average greater than 10 hours; however, there have been rare instances for labor to be two hours or less for a first time mother. It is more important for you to know if this is your second or third baby to realize that any labor after the first is usually shorter.  There is no way to tell how long or short the labor will be. Therefore, please call us if you are unsure labor has started.       Usually, during the last six weeks of pregnancy, Aroldo-Rodrigez contractions or “false labor pains occur”.  False labor is generally not very painful though it is not always easy to tell.  You may feel contractions, cramps or uterine tightening somewhere between every 3-30 minutes but they will not continue to get stronger over time.  If you lie down, drink plenty of fluid or walk around, the contractions may go away.   False contractions are very common if you have been active on your feet for several hours.   Women frequently worry about being sent home from the hospital without having their baby (i.e. the labor stopped).  Actually, this is an unnecessary worry, for this is an infrequent occurrence.     True labor usually begins in one of two ways.  In most patients it begins with contractions of the uterus, which are irregular (but not always) in the beginning.  They are cramp-like in character and feel similar to menstrual cramps.  After a while, they become more regular, and they seem to last a little longer, and feel a little sharper.  These symptoms are very important-more important- than the timing of the  contractions.  Having regular (usually closer), longer lasting (35-70 seconds), and sharper (more painful) contractions are the common symptoms of actual labor.  The second way in which labor can begin which occurs in approximately 30% of all patients is the rupture of the bag of water.  This is a sudden gush of watery fluid, usually sufficient to run down your leg and onto the floor, or you may wet a large area of the bed if it happens at night.  There may also be tricking that is uncontrolled.  If you are unsure, please call the office.      When should I head to the hospital?  When contractions are strong and every 5 minutes for one hour.  If you have a gush of water or you think you might be leaking fluid.  If you are bleeding heavily.  If your baby is not moving around at least every 1-2 hours.  If you are worried about something.  When you think you are ready to go to the hospital.    Who do I call with concerns?  Call the office at [phone].  If the office is closed, the answering service will send a message to the physician on call.  The on call physician will be available for emergency phone calls only.          Can I eat in labor?  It is good to eat a light meal at home before going to the hospital.  Eat foods like crackers, popsicles, soup and fruit.  Avoid foods that are difficult to digest like meat, a lot of dairy products and high fat foods.  DO NOT EAT if you know you are scheduled for a  ().      What will happen when I get to the hospital?  You are going to [facility] in [facility].  After 8 pm, you will need to go through the Emergency Department.  When you arrive at the hospital, you will be admitted and examined.   There are a few factors that will determine if you will be allowed to be up out of bed or if you would need to stay in bed.  The main factors are how well the baby is entering into the pelvis and if the bag of unger is in intact or ruptured.   An intravenous (IV) solution will, with exceptions, be started.  This is extremely important especially for the baby.   Your  will be allowed in the room during your labor. During the delivery, the nurses will inform you of the hospital policy and how many coaches are allowed.  You may desire pain medication or anesthesia for pain.  You probably discussed some aspects of pain medication with us during your prenatal care.  The various options may also have been discussed in Prenatal Classes.  We utilize IV narcotics and epidural anesthesia when our patients request to have them.  If you chose to have no anesthesia, none will be administered.  A local anesthetic may be used at the time of delivery      What should I to bring to the hospital?  Maternity clothes to go home in  You can bring your own night gown to wear after giving birth, but most women prefer to wear the hospital gown because it may get soiled  Nursing Bra if you are planning to breastfeed  Clothes for your baby to go home in  Baby Car Seat.  Be sure you know how to install it correctly. Please install it before going to the hospital  Routine toiletries like toothbrush, shampoo, hairbrush and etc.   You can bring your favorite pillow, but please put a colored pillow case on it so it doesn’t get mixed up with hospital pillows    How long will I stay in the hospital?  The date you leave the hospital may vary depending on the speed of your recovery.  If you have a vaginal delivery, you will stay in the hospital 24-48 hours after your delivery as long as you aren’t having any complications.    If you have had a , you will stay in the hospital 48-72 hours as long as you aren’t having any complications.       Going Home Instructions  There are no set rules as to what you may do each day or week after you are home.  You will receive discharge instructions to help you each day.  Remember, early ambulation in the hospital is to prevent  complications.  Do not let this lull you into a false sense of strength or ability to do certain physical acts which may tire you excessively.  Please call the office within a few days after you are discharged from the hospital to schedule your post-partum visit, which is usually 4-6 weeks after delivery.    Any medications necessary will be discussed on an individual basis.  If you decide to breastfeed your baby, you should continue your prenatal vitamins.  If you do not breastfeed, simply finish the prenatal vitamins you have.      The staff at [facility] wish you a joyous and exciting birth.  If there is anything we can do to make this a better experience for you please do not hesitate to ask.        KICK COUNT INSTRUCTIONS    After 28 weeks of pregnancy, we would like for you to monitor your baby’s movement daily by doing kick counts, an easy way for you to assess your baby’s well-being.    How to count kicks:  Choose a time when the baby is active, such as after a meal.  Sit comfortably or lie on your side, and count the number of movements in an hour… you should feel 10 movements in 2 hours    The evening hours seem to be the most convenient time to do this test, although you can also do this test anytime you are concerned about the baby’s movement.    Call the office right away at [phone] if you notice any of the following:  Your baby moves less than 10 times in 2 hours while you are doing kick counts.  Your baby moves much less often than on the days before.  You have not felt your baby move all day.

## 2024-09-12 ENCOUNTER — ROUTINE PRENATAL (OUTPATIENT)
Dept: OBGYN CLINIC | Facility: CLINIC | Age: 34
End: 2024-09-12
Payer: COMMERCIAL

## 2024-09-12 ENCOUNTER — APPOINTMENT (OUTPATIENT)
Dept: OBGYN CLINIC | Facility: CLINIC | Age: 34
End: 2024-09-12
Payer: COMMERCIAL

## 2024-09-12 VITALS
WEIGHT: 227 LBS | HEIGHT: 64 IN | BODY MASS INDEX: 38.76 KG/M2 | SYSTOLIC BLOOD PRESSURE: 134 MMHG | DIASTOLIC BLOOD PRESSURE: 72 MMHG

## 2024-09-12 DIAGNOSIS — O09.529 ANTEPARTUM MULTIGRAVIDA OF ADVANCED MATERNAL AGE (HCC): ICD-10-CM

## 2024-09-12 DIAGNOSIS — O24.414 INSULIN CONTROLLED GESTATIONAL DIABETES MELLITUS (GDM) IN SECOND TRIMESTER (HCC): ICD-10-CM

## 2024-09-12 DIAGNOSIS — Z34.90 PRENATAL CARE, ANTEPARTUM (HCC): Primary | ICD-10-CM

## 2024-09-12 DIAGNOSIS — O99.210 OBESITY AFFECTING PREGNANCY, ANTEPARTUM, UNSPECIFIED OBESITY TYPE (HCC): ICD-10-CM

## 2024-09-12 PROCEDURE — 3078F DIAST BP <80 MM HG: CPT | Performed by: OBSTETRICS & GYNECOLOGY

## 2024-09-12 PROCEDURE — 3008F BODY MASS INDEX DOCD: CPT | Performed by: OBSTETRICS & GYNECOLOGY

## 2024-09-12 PROCEDURE — 3075F SYST BP GE 130 - 139MM HG: CPT | Performed by: OBSTETRICS & GYNECOLOGY

## 2024-09-12 PROCEDURE — 59025 FETAL NON-STRESS TEST: CPT | Performed by: OBSTETRICS & GYNECOLOGY

## 2024-09-12 NOTE — PROGRESS NOTES
Chief Complaint   Patient presents with    Prenatal Care     HARPREET     Non-stress Test     Routine prenatal visit without complaints.  Patient denies any bleeding, leaking fluid, cramping, or regular uterine contractions.  Good fetal movement.  SVE: declined  NST: see report.  Reactive  Assessment/Plan:  38w0d doing well  GBS pos  AMA/Obesity/DM- pitocin IOL tomorrow am  Kick counts reviewed.  Reviewed labor signs and symptoms.  Diagnoses and all orders for this visit:    Prenatal care, antepartum ([facility])    Insulin controlled gestational diabetes mellitus (GDM) in second trimester ([facility])  -     FETAL NON-STRESS TEST [facility] ONLY 59357; Future    Antepartum multigravida of advanced maternal age ([facility])  -     FETAL NON-STRESS TEST [facility] ONLY 39021; Future    Obesity affecting pregnancy, antepartum, unspecified obesity type ([facility])  -     FETAL NON-STRESS TEST [facility] ONLY 39727; Future       Return for Postpartum Visit.

## 2024-09-13 ENCOUNTER — ANESTHESIA (OUTPATIENT)
Dept: OBGYN UNIT | Facility: HOSPITAL | Age: 34
End: 2024-09-13
Payer: COMMERCIAL

## 2024-09-13 ENCOUNTER — APPOINTMENT (OUTPATIENT)
Dept: OBGYN CLINIC | Facility: HOSPITAL | Age: 34
End: 2024-09-13
Payer: COMMERCIAL

## 2024-09-13 ENCOUNTER — ANESTHESIA EVENT (OUTPATIENT)
Dept: OBGYN UNIT | Facility: HOSPITAL | Age: 34
End: 2024-09-13
Payer: COMMERCIAL

## 2024-09-13 ENCOUNTER — HOSPITAL ENCOUNTER (INPATIENT)
Facility: HOSPITAL | Age: 34
LOS: 2 days | Discharge: HOME | End: 2024-09-15
Attending: OBSTETRICS & GYNECOLOGY | Admitting: OBSTETRICS & GYNECOLOGY
Payer: COMMERCIAL

## 2024-09-13 PROBLEM — Z98.890 STATUS POST INDUCTION OF LABOR: Status: ACTIVE | Noted: 2024-09-13

## 2024-09-13 PROBLEM — O14.93 PRE-ECLAMPSIA IN THIRD TRIMESTER (HCC): Status: ACTIVE | Noted: 2024-09-13

## 2024-09-13 PROBLEM — O99.013 ANEMIA IN PREGNANCY, THIRD TRIMESTER (HCC): Status: ACTIVE | Noted: 2024-09-13

## 2024-09-13 PROBLEM — O24.414 INSULIN CONTROLLED GESTATIONAL DIABETES MELLITUS (GDM) IN THIRD TRIMESTER (HCC): Status: ACTIVE | Noted: 2022-02-07

## 2024-09-13 PROBLEM — Z34.90 PREGNANCY (HCC): Status: ACTIVE | Noted: 2024-09-13

## 2024-09-13 PROBLEM — O99.213 OBESITY AFFECTING PREGNANCY IN THIRD TRIMESTER (HCC): Status: ACTIVE | Noted: 2024-02-21

## 2024-09-13 LAB
ALBUMIN SERPL-MCNC: 3.6 G/DL (ref 3.2–4.8)
ALBUMIN/GLOB SERPL: 1.3 {RATIO} (ref 1–2)
ALP LIVER SERPL-CCNC: 173 U/L
ALT SERPL-CCNC: 10 U/L
ANION GAP SERPL CALC-SCNC: 10 MMOL/L (ref 0–18)
ANION GAP SERPL CALC-SCNC: 9 MMOL/L (ref 0–18)
ANTIBODY SCREEN: NEGATIVE
AST SERPL-CCNC: 11 U/L (ref ?–34)
BASOPHILS # BLD AUTO: 0.03 X10(3) UL (ref 0–0.2)
BASOPHILS NFR BLD AUTO: 0.4 %
BILIRUB SERPL-MCNC: 0.3 MG/DL (ref 0.3–1.2)
BUN BLD-MCNC: 11 MG/DL (ref 9–23)
BUN BLD-MCNC: 9 MG/DL (ref 9–23)
CALCIUM BLD-MCNC: 9.2 MG/DL (ref 8.7–10.4)
CALCIUM BLD-MCNC: 9.3 MG/DL (ref 8.7–10.4)
CHLORIDE SERPL-SCNC: 109 MMOL/L (ref 98–112)
CHLORIDE SERPL-SCNC: 110 MMOL/L (ref 98–112)
CO2 SERPL-SCNC: 19 MMOL/L (ref 21–32)
CO2 SERPL-SCNC: 20 MMOL/L (ref 21–32)
CREAT BLD-MCNC: 0.76 MG/DL
CREAT BLD-MCNC: 0.79 MG/DL
CREAT UR-SCNC: 22.9 MG/DL
DEPRECATED HBV CORE AB SER IA-ACNC: 5.3 NG/ML
EGFRCR SERPLBLD CKD-EPI 2021: 104 ML/MIN/1.73M2 (ref 60–?)
EGFRCR SERPLBLD CKD-EPI 2021: 99 ML/MIN/1.73M2 (ref 60–?)
EOSINOPHIL # BLD AUTO: 0.18 X10(3) UL (ref 0–0.7)
EOSINOPHIL NFR BLD AUTO: 2.1 %
ERYTHROCYTE [DISTWIDTH] IN BLOOD BY AUTOMATED COUNT: 14 %
FASTING STATUS PATIENT QL REPORTED: NO
FASTING STATUS PATIENT QL REPORTED: NO
GLOBULIN PLAS-MCNC: 2.7 G/DL (ref 2–3.5)
GLUCOSE BLD-MCNC: 67 MG/DL (ref 70–99)
GLUCOSE BLD-MCNC: 68 MG/DL (ref 70–99)
GLUCOSE BLD-MCNC: 71 MG/DL (ref 70–99)
GLUCOSE BLD-MCNC: 74 MG/DL (ref 70–99)
GLUCOSE BLD-MCNC: 74 MG/DL (ref 70–99)
GLUCOSE BLD-MCNC: 75 MG/DL (ref 70–99)
GLUCOSE BLD-MCNC: 76 MG/DL (ref 70–99)
GLUCOSE BLD-MCNC: 76 MG/DL (ref 70–99)
GLUCOSE BLD-MCNC: 77 MG/DL (ref 70–99)
GLUCOSE BLD-MCNC: 80 MG/DL (ref 70–99)
GLUCOSE BLD-MCNC: 80 MG/DL (ref 70–99)
GLUCOSE BLD-MCNC: 81 MG/DL (ref 70–99)
GLUCOSE BLD-MCNC: 82 MG/DL (ref 70–99)
GLUCOSE BLD-MCNC: 97 MG/DL (ref 70–99)
HCT VFR BLD AUTO: 29.4 %
HGB BLD-MCNC: 9.9 G/DL
IMM GRANULOCYTES # BLD AUTO: 0.14 X10(3) UL (ref 0–1)
IMM GRANULOCYTES NFR BLD: 1.7 %
LYMPHOCYTES # BLD AUTO: 1.63 X10(3) UL (ref 1–4)
LYMPHOCYTES NFR BLD AUTO: 19.3 %
MCH RBC QN AUTO: 25.2 PG (ref 26–34)
MCHC RBC AUTO-ENTMCNC: 33.7 G/DL (ref 31–37)
MCV RBC AUTO: 74.8 FL
MONOCYTES # BLD AUTO: 0.64 X10(3) UL (ref 0.1–1)
MONOCYTES NFR BLD AUTO: 7.6 %
NEUTROPHILS # BLD AUTO: 5.84 X10 (3) UL (ref 1.5–7.7)
NEUTROPHILS # BLD AUTO: 5.84 X10(3) UL (ref 1.5–7.7)
NEUTROPHILS NFR BLD AUTO: 68.9 %
OSMOLALITY SERPL CALC.SUM OF ELEC: 285 MOSM/KG (ref 275–295)
OSMOLALITY SERPL CALC.SUM OF ELEC: 285 MOSM/KG (ref 275–295)
PLATELET # BLD AUTO: 218 10(3)UL (ref 150–450)
POTASSIUM SERPL-SCNC: 3.9 MMOL/L (ref 3.5–5.1)
POTASSIUM SERPL-SCNC: 4 MMOL/L (ref 3.5–5.1)
PROT SERPL-MCNC: 6.3 G/DL (ref 5.7–8.2)
PROT UR-MCNC: 7.1 MG/DL (ref ?–14)
PROT/CREAT UR-RTO: 0.31
RBC # BLD AUTO: 3.93 X10(6)UL
RH BLOOD TYPE: POSITIVE
SODIUM SERPL-SCNC: 138 MMOL/L (ref 136–145)
SODIUM SERPL-SCNC: 139 MMOL/L (ref 136–145)
T PALLIDUM AB SER QL IA: NONREACTIVE
URATE SERPL-MCNC: 5.4 MG/DL
WBC # BLD AUTO: 8.5 X10(3) UL (ref 4–11)

## 2024-09-13 PROCEDURE — 0UJD7ZZ INSPECTION OF UTERUS AND CERVIX, VIA NATURAL OR ARTIFICIAL OPENING: ICD-10-PCS | Performed by: OBSTETRICS & GYNECOLOGY

## 2024-09-13 PROCEDURE — 59400 OBSTETRICAL CARE: CPT | Performed by: OBSTETRICS & GYNECOLOGY

## 2024-09-13 PROCEDURE — 3E033VJ INTRODUCTION OF OTHER HORMONE INTO PERIPHERAL VEIN, PERCUTANEOUS APPROACH: ICD-10-PCS | Performed by: OBSTETRICS & GYNECOLOGY

## 2024-09-13 PROCEDURE — 0UC97ZZ EXTIRPATION OF MATTER FROM UTERUS, VIA NATURAL OR ARTIFICIAL OPENING: ICD-10-PCS | Performed by: OBSTETRICS & GYNECOLOGY

## 2024-09-13 PROCEDURE — 10907ZC DRAINAGE OF AMNIOTIC FLUID, THERAPEUTIC FROM PRODUCTS OF CONCEPTION, VIA NATURAL OR ARTIFICIAL OPENING: ICD-10-PCS | Performed by: OBSTETRICS & GYNECOLOGY

## 2024-09-13 PROCEDURE — 0HQ9XZZ REPAIR PERINEUM SKIN, EXTERNAL APPROACH: ICD-10-PCS | Performed by: OBSTETRICS & GYNECOLOGY

## 2024-09-13 RX ORDER — ROPIVACAINE HYDROCHLORIDE 5 MG/ML
INJECTION, SOLUTION EPIDURAL; INFILTRATION; PERINEURAL AS NEEDED
Status: DISCONTINUED | OUTPATIENT
Start: 2024-09-13 | End: 2024-09-13 | Stop reason: SURG

## 2024-09-13 RX ORDER — LIDOCAINE HYDROCHLORIDE 10 MG/ML
INJECTION, SOLUTION EPIDURAL; INFILTRATION; INTRACAUDAL; PERINEURAL AS NEEDED
Status: DISCONTINUED | OUTPATIENT
Start: 2024-09-13 | End: 2024-09-13 | Stop reason: SURG

## 2024-09-13 RX ORDER — ACETAMINOPHEN 500 MG
500 TABLET ORAL EVERY 6 HOURS PRN
Status: DISCONTINUED | OUTPATIENT
Start: 2024-09-13 | End: 2024-09-15

## 2024-09-13 RX ORDER — IBUPROFEN 600 MG/1
600 TABLET, FILM COATED ORAL EVERY 6 HOURS
Status: DISCONTINUED | OUTPATIENT
Start: 2024-09-13 | End: 2024-09-13

## 2024-09-13 RX ORDER — INSULIN DEGLUDEC 100 U/ML
60 INJECTION, SOLUTION SUBCUTANEOUS ONCE
Status: DISCONTINUED | OUTPATIENT
Start: 2024-09-13 | End: 2024-09-13 | Stop reason: ALTCHOICE

## 2024-09-13 RX ORDER — BUPIVACAINE HCL/0.9 % NACL/PF 0.25 %
5 PLASTIC BAG, INJECTION (ML) EPIDURAL AS NEEDED
Status: DISCONTINUED | OUTPATIENT
Start: 2024-09-13 | End: 2024-09-13

## 2024-09-13 RX ORDER — NICOTINE POLACRILEX 4 MG
30 LOZENGE BUCCAL
Status: DISCONTINUED | OUTPATIENT
Start: 2024-09-13 | End: 2024-09-15

## 2024-09-13 RX ORDER — NALBUPHINE HYDROCHLORIDE 10 MG/ML
2.5 INJECTION, SOLUTION INTRAMUSCULAR; INTRAVENOUS; SUBCUTANEOUS
Status: DISCONTINUED | OUTPATIENT
Start: 2024-09-13 | End: 2024-09-13

## 2024-09-13 RX ORDER — BISACODYL 10 MG
10 SUPPOSITORY, RECTAL RECTAL ONCE AS NEEDED
Status: DISCONTINUED | OUTPATIENT
Start: 2024-09-13 | End: 2024-09-15

## 2024-09-13 RX ORDER — DOCUSATE SODIUM 100 MG/1
100 CAPSULE, LIQUID FILLED ORAL
Status: DISCONTINUED | OUTPATIENT
Start: 2024-09-13 | End: 2024-09-14

## 2024-09-13 RX ORDER — DEXTROSE MONOHYDRATE 25 G/50ML
50 INJECTION, SOLUTION INTRAVENOUS
Status: DISCONTINUED | OUTPATIENT
Start: 2024-09-13 | End: 2024-09-13 | Stop reason: HOSPADM

## 2024-09-13 RX ORDER — LIDOCAINE HYDROCHLORIDE AND EPINEPHRINE 15; 5 MG/ML; UG/ML
INJECTION, SOLUTION EPIDURAL AS NEEDED
Status: DISCONTINUED | OUTPATIENT
Start: 2024-09-13 | End: 2024-09-13 | Stop reason: SURG

## 2024-09-13 RX ORDER — NICOTINE POLACRILEX 4 MG
30 LOZENGE BUCCAL
Status: DISCONTINUED | OUTPATIENT
Start: 2024-09-13 | End: 2024-09-13 | Stop reason: HOSPADM

## 2024-09-13 RX ORDER — AMMONIA INHALANTS 0.04 G/.3ML
0.3 INHALANT RESPIRATORY (INHALATION) AS NEEDED
Status: DISCONTINUED | OUTPATIENT
Start: 2024-09-13 | End: 2024-09-15

## 2024-09-13 RX ORDER — IBUPROFEN 100 MG/5ML
600 SUSPENSION, ORAL (FINAL DOSE FORM) ORAL EVERY 6 HOURS PRN
Status: DISCONTINUED | OUTPATIENT
Start: 2024-09-13 | End: 2024-09-15

## 2024-09-13 RX ORDER — HYDROMORPHONE HYDROCHLORIDE 1 MG/ML
1 INJECTION, SOLUTION INTRAMUSCULAR; INTRAVENOUS; SUBCUTANEOUS EVERY 2 HOUR PRN
Status: DISCONTINUED | OUTPATIENT
Start: 2024-09-13 | End: 2024-09-13 | Stop reason: HOSPADM

## 2024-09-13 RX ORDER — DEXTROSE MONOHYDRATE 25 G/50ML
50 INJECTION, SOLUTION INTRAVENOUS
Status: DISCONTINUED | OUTPATIENT
Start: 2024-09-13 | End: 2024-09-15

## 2024-09-13 RX ORDER — ONDANSETRON 2 MG/ML
4 INJECTION INTRAMUSCULAR; INTRAVENOUS EVERY 6 HOURS PRN
Status: DISCONTINUED | OUTPATIENT
Start: 2024-09-13 | End: 2024-09-13 | Stop reason: HOSPADM

## 2024-09-13 RX ORDER — ACETAMINOPHEN 500 MG
1000 TABLET ORAL EVERY 6 HOURS PRN
Status: DISCONTINUED | OUTPATIENT
Start: 2024-09-13 | End: 2024-09-13 | Stop reason: HOSPADM

## 2024-09-13 RX ORDER — NICOTINE POLACRILEX 4 MG
15 LOZENGE BUCCAL
Status: DISCONTINUED | OUTPATIENT
Start: 2024-09-13 | End: 2024-09-15

## 2024-09-13 RX ORDER — ACETAMINOPHEN 500 MG
500 TABLET ORAL EVERY 6 HOURS PRN
Status: DISCONTINUED | OUTPATIENT
Start: 2024-09-13 | End: 2024-09-13 | Stop reason: HOSPADM

## 2024-09-13 RX ORDER — IBUPROFEN 600 MG/1
600 TABLET, FILM COATED ORAL ONCE AS NEEDED
Status: DISCONTINUED | OUTPATIENT
Start: 2024-09-13 | End: 2024-09-13 | Stop reason: HOSPADM

## 2024-09-13 RX ORDER — CITRIC ACID/SODIUM CITRATE 334-500MG
30 SOLUTION, ORAL ORAL AS NEEDED
Status: DISCONTINUED | OUTPATIENT
Start: 2024-09-13 | End: 2024-09-13 | Stop reason: HOSPADM

## 2024-09-13 RX ORDER — TERBUTALINE SULFATE 1 MG/ML
0.25 INJECTION, SOLUTION SUBCUTANEOUS AS NEEDED
Status: DISCONTINUED | OUTPATIENT
Start: 2024-09-13 | End: 2024-09-13 | Stop reason: HOSPADM

## 2024-09-13 RX ORDER — SODIUM CHLORIDE 9 MG/ML
25 INJECTION, SOLUTION INTRAVENOUS CONTINUOUS PRN
Status: DISCONTINUED | OUTPATIENT
Start: 2024-09-13 | End: 2024-09-13 | Stop reason: HOSPADM

## 2024-09-13 RX ORDER — ACETAMINOPHEN 500 MG
1000 TABLET ORAL EVERY 6 HOURS PRN
Status: DISCONTINUED | OUTPATIENT
Start: 2024-09-13 | End: 2024-09-15

## 2024-09-13 RX ORDER — NICOTINE POLACRILEX 4 MG
15 LOZENGE BUCCAL
Status: DISCONTINUED | OUTPATIENT
Start: 2024-09-13 | End: 2024-09-13 | Stop reason: HOSPADM

## 2024-09-13 RX ORDER — DEXTROSE, SODIUM CHLORIDE, SODIUM LACTATE, POTASSIUM CHLORIDE, AND CALCIUM CHLORIDE 5; .6; .31; .03; .02 G/100ML; G/100ML; G/100ML; G/100ML; G/100ML
INJECTION, SOLUTION INTRAVENOUS CONTINUOUS PRN
Status: DISCONTINUED | OUTPATIENT
Start: 2024-09-13 | End: 2024-09-13 | Stop reason: HOSPADM

## 2024-09-13 RX ORDER — SODIUM CHLORIDE, SODIUM LACTATE, POTASSIUM CHLORIDE, CALCIUM CHLORIDE 600; 310; 30; 20 MG/100ML; MG/100ML; MG/100ML; MG/100ML
INJECTION, SOLUTION INTRAVENOUS CONTINUOUS PRN
Status: DISCONTINUED | OUTPATIENT
Start: 2024-09-13 | End: 2024-09-13 | Stop reason: HOSPADM

## 2024-09-13 RX ORDER — SODIUM CHLORIDE, SODIUM LACTATE, POTASSIUM CHLORIDE, CALCIUM CHLORIDE 600; 310; 30; 20 MG/100ML; MG/100ML; MG/100ML; MG/100ML
INJECTION, SOLUTION INTRAVENOUS CONTINUOUS
Status: DISCONTINUED | OUTPATIENT
Start: 2024-09-13 | End: 2024-09-13 | Stop reason: HOSPADM

## 2024-09-13 RX ORDER — DEXTROSE, SODIUM CHLORIDE, SODIUM LACTATE, POTASSIUM CHLORIDE, AND CALCIUM CHLORIDE 5; .6; .31; .03; .02 G/100ML; G/100ML; G/100ML; G/100ML; G/100ML
INJECTION, SOLUTION INTRAVENOUS AS NEEDED
Status: DISCONTINUED | OUTPATIENT
Start: 2024-09-13 | End: 2024-09-13 | Stop reason: HOSPADM

## 2024-09-13 RX ORDER — SIMETHICONE 80 MG
80 TABLET,CHEWABLE ORAL 3 TIMES DAILY PRN
Status: DISCONTINUED | OUTPATIENT
Start: 2024-09-13 | End: 2024-09-15

## 2024-09-13 RX ADMIN — LIDOCAINE HYDROCHLORIDE 5 ML: 10 INJECTION, SOLUTION EPIDURAL; INFILTRATION; INTRACAUDAL; PERINEURAL at 11:26:00

## 2024-09-13 RX ADMIN — ROPIVACAINE HYDROCHLORIDE 3 ML: 5 INJECTION, SOLUTION EPIDURAL; INFILTRATION; PERINEURAL at 20:06:00

## 2024-09-13 RX ADMIN — LIDOCAINE HYDROCHLORIDE AND EPINEPHRINE 3 ML: 15; 5 INJECTION, SOLUTION EPIDURAL at 11:26:00

## 2024-09-13 NOTE — PROGRESS NOTES
[facility]  Obstetrics and Gynecology    OB/GYN: Intrapartum Progress Note     SUBJECTIVE:  Patient is a 34 year old  female at 38w1d admitted for IOL. Patient reports doing well. Pain well controlled.     OBJECTIVE:  Vitals:    24 1415 24 1515 24 1615 24 1711   BP: 131/84 122/80 134/86 (!) 137/96   Pulse: 75 86 101 93   Resp:  16     Temp:    98.4 °F (36.9 °C)   TempSrc:    Oral   SpO2:       Weight:       Height:           Physical Exam:  General: AAO. NAD.   Fundus + gravid.    FHT: moderate variability / 130 BPM / + accel / no decel   [place]: q 2-3 min  SVE: 4/50/-2 s/p AROM with clear fluid     ASSESSMENT/PLAN:  Patient is a 34 year old  female at 38w1d admitted for IOL.     Doing well   Continue routine intrapartum care  Continue pitocin per protocol    GBS negative   Continue epidural per request   GDMA2  - continue glucose monitoring   - may initiate insulin per protocol per JULIAN Hale MD   [facility] - OBGYN

## 2024-09-13 NOTE — PLAN OF CARE
Problem: BIRTH - VAGINAL/ SECTION  Goal: Fetal and maternal status remain reassuring during the birth process  Description: INTERVENTIONS:  - Monitor vital signs  - Monitor fetal heart rate  - Monitor uterine activity  - Monitor labor progression (vaginal delivery)  - DVT prophylaxis (C/S delivery)  - Surgical antibiotic prophylaxis (C/S delivery)  Outcome: Progressing     Problem: PAIN - ADULT  Goal: Verbalizes/displays adequate comfort level or patient's stated pain goal  Description: INTERVENTIONS:  - Encourage pt to monitor pain and request assistance  - Assess pain using appropriate pain scale  - Administer analgesics based on type and severity of pain and evaluate response  - Implement non-pharmacological measures as appropriate and evaluate response  - Consider cultural and social influences on pain and pain management  - Manage/alleviate anxiety  - Utilize distraction and/or relaxation techniques  - Monitor for opioid side effects  - Notify MD/LIP if interventions unsuccessful or patient reports new pain  - Anticipate increased pain with activity and pre-medicate as appropriate  Outcome: Progressing     Problem: ANXIETY  Goal: Will report anxiety at manageable levels  Description: INTERVENTIONS:  - Administer medication as ordered  - Teach and rehearse alternative coping skills  - Provide emotional support with 1:1 interaction with staff  Outcome: Progressing     Problem: Diabetes/Glucose Control  Goal: Glucose maintained within prescribed range  Description: INTERVENTIONS:  - Monitor Blood Glucose as ordered  - Assess for signs and symptoms of hyperglycemia and hypoglycemia  - Administer ordered medications to maintain glucose within target range  - Assess barriers to adequate nutritional intake and initiate nutrition consult as needed  - Instruct patient on self management of diabetes  Outcome: Progressing     Problem: Patient/Family Goals  Goal: Patient/Family Long Term Goal  Description:  Patient's Long Term Goal:Uncomplicated vaginal delivery    Interventions:  VS per protocol  I&O  Ice chips and sips as tolerated  EFM per protocol  Maintain IV as ordered  Antibiotics as needed per protocol  Informed consent    - See additional Care Plan goals for specific interventions  Outcome: Progressing  Goal: Patient/Family Short Term Goal  Description: Patient's Short Term Goal: Adequate pain control with delivery of infant  Interventions:  Pain assessment scores as ordered  Patient scores pain a \"3\" or less  Multidisciplinary care   Nonpharmacologic comfort measures      - See additional Care Plan goals for specific interventions  Outcome: Progressing

## 2024-09-13 NOTE — PROGRESS NOTES
Pt is a 34 year old female admitted to [facility].     Chief Complaint   Patient presents with    Scheduled Induction      Pt is  38w1d intra-uterine pregnancy.  History obtained, consents signed. Oriented to room, staff, and plan of care.

## 2024-09-13 NOTE — ANESTHESIA PROCEDURE NOTES
Labor Analgesia    Date/Time: 9/13/2024 11:13 AM    Performed by: Je Mercedes MD  Authorized by: Je Mercedes MD      General Information and Staff    Start Time:  9/13/2024 11:13 AM  End Time:  9/13/2024 11:25 AM  Anesthesiologist:  Je Mercedes MD  Performed by:  Anesthesiologist  Patient Location:  OB  Site Identification: surface landmarks    Reason for Block: labor epidural    Preanesthetic Checklist: patient identified, IV checked, risks and benefits discussed, monitors and equipment checked, pre-op evaluation, timeout performed, IV bolus, anesthesia consent and sterile technique used      Procedure Details    Patient Position:  Sitting  Prep: ChloraPrep    Monitoring:  Heart rate and continuous pulse ox  Approach:  Midline    Epidural Needle    Injection Technique:  GEOVANNY air  Needle Type:  Tuohy  Needle Gauge:  17 G  Needle Length:  3.375 in  Needle Insertion Depth:  6    Spinal Needle      Catheter    Catheter Type:  End hole  Catheter Size:  19 G  Catheter at Skin Depth:  11  Test Dose:  Negative    Assessment      Additional Comments

## 2024-09-13 NOTE — ANESTHESIA PREPROCEDURE EVALUATION
PRE-OP EVALUATION    Patient Name: Luba Lane    Admit Diagnosis: pregnancy  Pregnancy (HCC)    Pre-op Diagnosis: LABOR PAIN        Anesthesia Procedure: LABOR ANALGESIA    * No surgeons found in log *    Pre-op vitals reviewed.  Temp: 98.4 °F (36.9 °C)  Pulse: 101  Resp: 16  BP: 130/77  SpO2: 99 %  Body mass index is 38.96 kg/m².    Current medications reviewed.  Hospital Medications:   dextrose in lactated ringers 5% infusion  50 mL/hr Intravenous Continuous PRN    lactated ringers infusion   mL/hr Intravenous Continuous PRN    sodium chloride 0.9% infusion  25 mL/hr Intravenous Continuous PRN    glucose (Dex4) 15 GM/59ML oral liquid 15 g  15 g Oral Q15 Min PRN    Or    glucose (Glutose) 40% oral gel 15 g  15 g Oral Q15 Min PRN    Or    glucose-vitamin C (Dex-4) chewable tab 4 tablet  4 tablet Oral Q15 Min PRN    Or    dextrose 50% injection 50 mL  50 mL Intravenous Q15 Min PRN    Or    glucose (Dex4) 15 GM/59ML oral liquid 30 g  30 g Oral Q15 Min PRN    Or    glucose (Glutose) 40% oral gel 30 g  30 g Oral Q15 Min PRN    Or    glucose-vitamin C (Dex-4) chewable tab 8 tablet  8 tablet Oral Q15 Min PRN    insulin regular human (Novolin R, Humulin R) 100 Units in sodium chloride 0.9% 100 mL standard infusion (100 mL)  0.5-5.5 Units/hr Intravenous Continuous    lactated ringers infusion   Intravenous Continuous    dextrose in lactated ringers 5% infusion   Intravenous PRN    lactated ringers IV bolus 500 mL  500 mL Intravenous PRN    acetaminophen (Tylenol Extra Strength) tab 500 mg  500 mg Oral Q6H PRN    acetaminophen (Tylenol Extra Strength) tab 1,000 mg  1,000 mg Oral Q6H PRN    ibuprofen (Motrin) tab 600 mg  600 mg Oral Once PRN    ondansetron (Zofran) 4 MG/2ML injection 4 mg  4 mg Intravenous Q6H PRN    oxyTOCIN in sodium chloride 0.9% (Pitocin) 30 Units/500mL infusion premix  62.5-900 juanjose-units/min Intravenous Continuous    terbutaline (Brethine) 1 MG/ML injection 0.25 mg  0.25 mg  Subcutaneous PRN    sodium citrate-citric acid (Bicitra) 500-334 MG/5ML oral solution 30 mL  30 mL Oral PRN    HYDROmorphone (Dilaudid) 1 MG/ML injection 1 mg  1 mg Intravenous Q2H PRN    [COMPLETED] ampicillin (Omnipen) 2 g in sodium chloride 0.9% 100 mL IVPB-MBP  2 g Intravenous Once    Followed by    ampicillin (Omnipen) 1 g in sodium chloride 0.9% 100 mL IVPB-MBP  1 g Intravenous Q4H    oxyTOCIN in sodium chloride 0.9% (Pitocin) 30 Units/500mL infusion premix  0.5-20 juanjose-units/min Intravenous Continuous    lactated ringers IV bolus 1,000 mL  1,000 mL Intravenous Once    fentaNYL-bupivacaine 2 mcg/mL-0.125% in sodium chloride 0.9% 100 mL EPIDURAL infusion premix  12 mL/hr Epidural Continuous    fentaNYL (Sublimaze) 50 mcg/mL injection 100 mcg  100 mcg Epidural Once    EPHEDrine (PF) 25 MG/5 ML injection 5 mg  5 mg Intravenous PRN    nalbuphine (Nubain) 10 mg/mL injection 2.5 mg  2.5 mg Intravenous Q15 Min PRN       Outpatient Medications:     Medications Prior to Admission   Medication Sig Dispense Refill Last Dose    Insulin Glargine, 1 Unit Dial, (TOUJEO SOLOSTAR) 300 UNIT/ML Subcutaneous Solution Pen-injector Inject 14 Units into the skin After Breakfast AND 80 Units nightly. Divide nightly dose into two separate injections of 40 units each. 15 mL 0 9/12/2024    Lancets (ONETOUCH DELICA PLUS [id number]) Does not apply Misc USE TO TEST SUGAR FOUR TIMES DAILY 100 each 3 9/12/2024    ferrous sulfate 325 (65 FE) MG Oral Tab EC Take 1 tablet (325 mg total) by mouth daily with breakfast.   9/12/2024    BD PEN NEEDLE BERNARD 2ND GEN 32G X 4 MM Does not apply Misc USE NEW NEEDLE WITH EACH INJECTION NIGHTLY   9/12/2024    Glucose Blood (BLOOD GLUCOSE TEST STRIPS 333) In Vitro Strip 4 strips daily. Please dispense any brand that insurance will cover (patient using One Touch). Patient will check glucose 4 times daily. Fasting in the morning, 2 hours after breakfast, 2 hours after lunch, 2 hours after dinner. 100 strip 2  2024    Blood Glucose Monitoring Suppl (ONETOUCH ULTRA 2) w/Device Does not apply Kit 1 each 4 (four) times daily.   2024    famotidine 10 MG Oral Tab Take 1 tablet (10 mg total) by mouth 2 (two) times daily.   2024    aspirin 81 MG Oral Tab EC Take 1 tablet (81 mg total) by mouth daily.   2024    Prenatal 27-0.8 MG Oral Tab Take 1 tablet by mouth daily.   2024       Allergies: Patient has no known allergies.      Anesthesia Evaluation    Patient summary reviewed.    Anesthetic Complications  (-) history of anesthetic complications         GI/Hepatic/Renal    Negative GI/hepatic/renal ROS.                             Cardiovascular        Exercise tolerance: good     MET: >4    (+) obesity  (+) hypertension                                     Endo/Other      (+) diabetes  gestational,                          Pulmonary    Negative pulmonary ROS.                       Neuro/Psych    Negative neuro/psych ROS.                                  Past Surgical History:   Procedure Laterality Date    Other  2017    elective       Social History     Socioeconomic History    Marital status:    Tobacco Use    Smoking status: Never    Smokeless tobacco: Never   Vaping Use    Vaping status: Never Used   Substance and Sexual Activity    Alcohol use: Not Currently     Comment: occ    Drug use: Never    Sexual activity: Yes     Partners: Male   Other Topics Concern    Caffeine Concern Yes     Comment: OCC    Exercise No    Seat Belt Yes     History   Drug Use Unknown     Available pre-op labs reviewed.  Lab Results   Component Value Date    WBC 8.5 2024    RBC 3.93 2024    HGB 9.9 (L) 2024    HCT 29.4 (L) 2024    MCV 74.8 (L) 2024    MCH 25.2 (L) 2024    MCHC 33.7 2024    RDW 14.0 2024    .0 2024     Lab Results   Component Value Date     2024    K 4.0 2024     2024    CO2 20.0 (L) 2024    BUN  9 09/13/2024    CREATSERUM 0.76 09/13/2024    GLU 71 09/13/2024    CA 9.3 09/13/2024            Airway      Mallampati: III  Mouth opening: >3 FB  TM distance: 4 - 6 cm  Neck ROM: full Cardiovascular    Cardiovascular exam normal.         Dental             Pulmonary    Pulmonary exam normal.                 Other findings              ASA: 3   Plan: epidural             Plan/risks discussed with: patient                Present on Admission:  **None**

## 2024-09-13 NOTE — H&P
[facility]  Obstetrics and Gynecology  History & Physical    Luba Lane Patient Status:  Inpatient    1990 MRN [id number]   Location [facility] LABOR & DELIVERY Attending Geetha Hale MD   Hospital Day 0 PCP Unknown Pcp     CC: Patient is here for IOL 2/2 GDMA2 on insulin     SUBJECTIVE:    Luba Lane is a 34 year old  female at 38w1d Estimated Date of Delivery: 24 who is being admitted for IOL 2/2 GDMA2 on insulin. Her current obstetrical history is significant for GDMA2 on insulin , AMA, obesity, hx of pre eclampsia and GBS bacteruria. Patient reports no complaints.     negative UCx.    negative VB.   negative LOF.    positive Fetal movement.   negative Nausea, Vomiting, headache, vision changes and RUQ/Epigastric pain.         NOEMI Confirmation  LMP: Patient's last menstrual period was 2023 (exact date).  NOEMI: 2024, by Last Menstrual Period       Obstetric History:   OB History    Para Term  AB Living   3 1 1 0 1 1   SAB IAB Ectopic Multiple Live Births   0 0 0 0 1      # Outcome Date GA Lbr Jerrell/2nd Weight Sex Type Anes PTL Lv   3 Current            2 Term 22 37w2d 04:23 / 00:46 7 lb 11.5 oz (3.5 kg) M NORMAL SPONT EPI N DMITRIY      Complications: Variable decelerations, Preeclampsia   1 AB 2017 7w0d            Past Medical History:   Past Medical History:    Anemia    Gestational diabetes (HCC)    Pregnancy-induced hypertension (HCC)     Past Social History:   Past Surgical History:   Procedure Laterality Date    Other  2017    elective       Family History:   Family History   Problem Relation Age of Onset    Diabetes Father     Cancer Father      Social History:   Social History     Tobacco Use    Smoking status: Never    Smokeless tobacco: Never   Substance Use Topics    Alcohol use: Not Currently     Comment: occ       Home Meds:   Medications Prior to Admission   Medication Sig Dispense Refill Last  Dose    Insulin Glargine, 1 Unit Dial, (TOUJEO SOLOSTAR) 300 UNIT/ML Subcutaneous Solution Pen-injector Inject 14 Units into the skin After Breakfast AND 80 Units nightly. Divide nightly dose into two separate injections of 40 units each. 15 mL 0 9/12/2024    Lancets (ONETOUCH DELICA PLUS [id number]) Does not apply Misc USE TO TEST SUGAR FOUR TIMES DAILY 100 each 3 9/12/2024    ferrous sulfate 325 (65 FE) MG Oral Tab EC Take 1 tablet (325 mg total) by mouth daily with breakfast.   9/12/2024    BD PEN NEEDLE BERNARD 2ND GEN 32G X 4 MM Does not apply Misc USE NEW NEEDLE WITH EACH INJECTION NIGHTLY   9/12/2024    Glucose Blood (BLOOD GLUCOSE TEST STRIPS 333) In Vitro Strip 4 strips daily. Please dispense any brand that insurance will cover (patient using One Touch). Patient will check glucose 4 times daily. Fasting in the morning, 2 hours after breakfast, 2 hours after lunch, 2 hours after dinner. 100 strip 2 9/13/2024    Blood Glucose Monitoring Suppl (ONETOUCH ULTRA 2) w/Device Does not apply Kit 1 each 4 (four) times daily.   9/12/2024    famotidine 10 MG Oral Tab Take 1 tablet (10 mg total) by mouth 2 (two) times daily.   9/12/2024    aspirin 81 MG Oral Tab EC Take 1 tablet (81 mg total) by mouth daily.   9/12/2024    Prenatal 27-0.8 MG Oral Tab Take 1 tablet by mouth daily.   9/12/2024     Allergies: No Known Allergies    OBJECTIVE:    Temp:  [97.7 °F (36.5 °C)-98.4 °F (36.9 °C)] 97.7 °F (36.5 °C)  Pulse:  [] 86  Resp:  [16] 16  BP: ()/(55-94) 122/80  SpO2:  [99 %-100 %] 100 %  Body mass index is 38.96 kg/m².    General: AAO. NAD  Lungs: no tachypnea, retractions or cyanosis  CV: not examined  Abdomen: FHT present, gravid   Extremities: negative edema bilaterally, negative calf tenderness bilaterally, Paula's sign negative bilaterally     FHT: moderate variability/130 BPM / Positive accelerations/Negative decelerations   TOCO: q 2-3 minutes    SVE: 2 / 50 / -3 but ballotable     [place]:  cephalic, EFW 8  lbs 0 oz    Prenatal Labs Brief Review   Blood Type:   Lab Results   Component Value Date    ABO A 2024    RH Positive 2024     GBS:  Positive      Inpatient labs:  Lab Results   Component Value Date    WBC 8.5 2024    HGB 9.9 2024    HCT 29.4 2024    .0 2024    CREATSERUM 0.76 2024    BUN 9 2024     2024    K 4.0 2024     2024    CO2 20.0 2024    GLU 71 2024    CA 9.3 2024    ALB 3.6 2024    ALKPHO 173 2024    BILT 0.3 2024    TP 6.3 2024    AST 11 2024    ALT 10 2024       ASSESSMENT/ PLAN:    Luba Lane is a 34 year old  female at 38w1d Estimated Date of Delivery: 24 who is being admitted for IOL 2/2 GDMA2.    Patient Active Problem List    Diagnosis    Pregnancy ([facility])    GBS bacteriuria     Urine culture       Pregnancy, supervision, high-risk ([facility])     Low dose ASA  [X] MFM w/ level 2 US - nl  [X] fetal echo - nl  Monthly growth ultrasound in the third trimester with the addition of a BPP at each growth assessment 32 weeks and beyond  Daily low-dose aspirin ( mg  daily)  Weekly nonstress test at 32 weeks, twice weekly at 34 weeks  Delivery is advised at 38+0 to 39+6      History of pre-eclampsia     Low dose ASA  [ x] baseline labs   [ x] PCR      History of gestational diabetes     [x ] early 1 hr GTT abnormal  [ X] 3 hr GTT abnormal      Antepartum multigravida of advanced maternal age ([facility])             Obesity affecting pregnancy, antepartum ([facility])     [ x] early 1 hr GTT - abnl  [X ] MFM       Insulin controlled gestational diabetes mellitus (GDM) in second trimester ([facility])     [x] diabetic education   [ ] glucose QID  21wks started insulin with MFM  Monthly growth ultrasound with the addition of a BPP at each growth assessment 32 weeks and beyond; 43% EFW at 32 wks; EFW 2761 g ( 6 lb 1 oz); 31% @ 36wga  Daily low-dose aspirin ( mg   daily)  Weekly nonstress test at 32 weeks, twice weekly at 34 weeks  Delivery is advised at 38 weeks 0 days to 39 weeks   Check glucoses 4 times a day and upload to glucose flowsheet in my chart every 1 to 2 days for weekly MFM review      Cervical high risk human papillomavirus (HPV) 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  Patient declines and will defer to postpartum         1. Labor:   - Pitocin per protocol  2. Fetal monitoring: CEFM  3. GBS: positive. Continue Ampicillin per protocol   4. Pain: continue epidural per patient request  5. GDMA2  - continue glucose monitoring   - may initiate insulin per protocol per MFM     Risks, benefits, alternatives and possible complications have been discussed in detail with the patient.  Pre-admission, admission, and post admission procedures and expectations were discussed in detail.  All questions answered, all appropriate consents will be signed at the Hospital. Admission is planned for today.  anticipated.    Geetha Hale MD   [facility] - OBGYN          Note to patient and family   The 21st Century Cures Act makes medical notes available to patients in the interest of transparency.  However, please be advised that this is a medical document.  It is intended as [id number] communication.  It is written and medical language may contain abbreviations or verbiage that are technical and unfamiliar.  It may appear blunt or direct.  Medical documents are intended to carry relevant information, facts as evident, and the clinical opinion of the practitioner.

## 2024-09-14 LAB
BASOPHILS # BLD AUTO: 0.03 X10(3) UL (ref 0–0.2)
BASOPHILS NFR BLD AUTO: 0.3 %
EOSINOPHIL # BLD AUTO: 0.08 X10(3) UL (ref 0–0.7)
EOSINOPHIL NFR BLD AUTO: 0.8 %
ERYTHROCYTE [DISTWIDTH] IN BLOOD BY AUTOMATED COUNT: 14.4 %
GLUCOSE BLD-MCNC: 124 MG/DL (ref 70–99)
GLUCOSE BLD-MCNC: 151 MG/DL (ref 70–99)
GLUCOSE BLD-MCNC: 84 MG/DL (ref 70–99)
GLUCOSE BLD-MCNC: 87 MG/DL (ref 70–99)
HCT VFR BLD AUTO: 24.2 %
HGB BLD-MCNC: 8 G/DL
IMM GRANULOCYTES # BLD AUTO: 0.11 X10(3) UL (ref 0–1)
IMM GRANULOCYTES NFR BLD: 1.1 %
LYMPHOCYTES # BLD AUTO: 1.73 X10(3) UL (ref 1–4)
LYMPHOCYTES NFR BLD AUTO: 17.1 %
MCH RBC QN AUTO: 24.9 PG (ref 26–34)
MCHC RBC AUTO-ENTMCNC: 33.1 G/DL (ref 31–37)
MCV RBC AUTO: 75.4 FL
MONOCYTES # BLD AUTO: 0.58 X10(3) UL (ref 0.1–1)
MONOCYTES NFR BLD AUTO: 5.7 %
NEUTROPHILS # BLD AUTO: 7.57 X10 (3) UL (ref 1.5–7.7)
NEUTROPHILS # BLD AUTO: 7.57 X10(3) UL (ref 1.5–7.7)
NEUTROPHILS NFR BLD AUTO: 75 %
PLATELET # BLD AUTO: 179 10(3)UL (ref 150–450)
RBC # BLD AUTO: 3.21 X10(6)UL
WBC # BLD AUTO: 10.1 X10(3) UL (ref 4–11)

## 2024-09-14 NOTE — PLAN OF CARE
Problem: BIRTH - VAGINAL/ SECTION  Goal: Fetal and maternal status remain reassuring during the birth process  Description: INTERVENTIONS:  - Monitor vital signs  - Monitor fetal heart rate  - Monitor uterine activity  - Monitor labor progression (vaginal delivery)  - DVT prophylaxis (C/S delivery)  - Surgical antibiotic prophylaxis (C/S delivery)  Outcome: Progressing     Problem: PAIN - ADULT  Goal: Verbalizes/displays adequate comfort level or patient's stated pain goal  Description: INTERVENTIONS:  - Encourage pt to monitor pain and request assistance  - Assess pain using appropriate pain scale  - Administer analgesics based on type and severity of pain and evaluate response  - Implement non-pharmacological measures as appropriate and evaluate response  - Consider cultural and social influences on pain and pain management  - Manage/alleviate anxiety  - Utilize distraction and/or relaxation techniques  - Monitor for opioid side effects  - Notify MD/LIP if interventions unsuccessful or patient reports new pain  - Anticipate increased pain with activity and pre-medicate as appropriate  Outcome: Progressing     Problem: ANXIETY  Goal: Will report anxiety at manageable levels  Description: INTERVENTIONS:  - Administer medication as ordered  - Teach and rehearse alternative coping skills  - Provide emotional support with 1:1 interaction with staff  Outcome: Progressing     Problem: Diabetes/Glucose Control  Goal: Glucose maintained within prescribed range  Description: INTERVENTIONS:  - Monitor Blood Glucose as ordered  - Assess for signs and symptoms of hyperglycemia and hypoglycemia  - Administer ordered medications to maintain glucose within target range  - Assess barriers to adequate nutritional intake and initiate nutrition consult as needed  - Instruct patient on self management of diabetes  Outcome: Progressing     Problem: Patient/Family Goals  Goal: Patient/Family Long Term Goal  Description:  Patient's Long Term Goal:Uncomplicated vaginal delivery    Interventions:  VS per protocol  I&O  Ice chips and sips as tolerated  EFM per protocol  Maintain IV as ordered  Antibiotics as needed per protocol  Informed consent    - See additional Care Plan goals for specific interventions  Outcome: Progressing  Goal: Patient/Family Short Term Goal  Description: Patient's Short Term Goal: Adequate pain control with delivery of infant  Interventions:  Pain assessment scores as ordered  Patient scores pain a \"3\" or less  Multidisciplinary care   Nonpharmacologic comfort measures      - See additional Care Plan goals for specific interventions  Outcome: Progressing     Problem: SAFETY ADULT - FALL  Goal: Free from fall injury  Description: INTERVENTIONS:  - Assess pt frequently for physical needs  - Identify cognitive and physical deficits and behaviors that affect risk of falls.  - [place] fall precautions as indicated by assessment.  - Educate pt/family on patient safety including physical limitations  - Instruct pt to call for assistance with activity based on assessment  - Modify environment to reduce risk of injury  - Provide assistive devices as appropriate  - Consider OT/PT consult to assist with strengthening/mobility  - Encourage toileting schedule  Outcome: Progressing

## 2024-09-14 NOTE — PLAN OF CARE
Problem: BIRTH - VAGINAL/ SECTION  Goal: Fetal and maternal status remain reassuring during the birth process  Description: INTERVENTIONS:  - Monitor vital signs  - Monitor fetal heart rate  - Monitor uterine activity  - Monitor labor progression (vaginal delivery)  - DVT prophylaxis (C/S delivery)  - Surgical antibiotic prophylaxis (C/S delivery)  2024 by Nicolasa Whalen RN  Outcome: Completed  2024 by Nicolasa Whalen RN  Outcome: Progressing     Problem: PAIN - ADULT  Goal: Verbalizes/displays adequate comfort level or patient's stated pain goal  Description: INTERVENTIONS:  - Encourage pt to monitor pain and request assistance  - Assess pain using appropriate pain scale  - Administer analgesics based on type and severity of pain and evaluate response  - Implement non-pharmacological measures as appropriate and evaluate response  - Consider cultural and social influences on pain and pain management  - Manage/alleviate anxiety  - Utilize distraction and/or relaxation techniques  - Monitor for opioid side effects  - Notify MD/LIP if interventions unsuccessful or patient reports new pain  - Anticipate increased pain with activity and pre-medicate as appropriate  2024 by Nicolasa Whalen RN  Outcome: Completed  2024 by Nicolasa Whalen RN  Outcome: Progressing     Problem: ANXIETY  Goal: Will report anxiety at manageable levels  Description: INTERVENTIONS:  - Administer medication as ordered  - Teach and rehearse alternative coping skills  - Provide emotional support with 1:1 interaction with staff  2024 by Nicolasa Whalen RN  Outcome: Completed  2024 by Nicolasa Whalen RN  Outcome: Progressing     Problem: Diabetes/Glucose Control  Goal: Glucose maintained within prescribed range  Description: INTERVENTIONS:  - Monitor Blood Glucose as ordered  - Assess for signs and symptoms of hyperglycemia and hypoglycemia  - Administer ordered medications to  maintain glucose within target range  - Assess barriers to adequate nutritional intake and initiate nutrition consult as needed  - Instruct patient on self management of diabetes  9/13/2024 2218 by Nicolasa Whalen RN  Outcome: Completed  9/13/2024 1929 by Nicolasa Whalen RN  Outcome: Progressing     Problem: Patient/Family Goals  Goal: Patient/Family Long Term Goal  Description: Patient's Long Term Goal:Uncomplicated vaginal delivery    Interventions:  VS per protocol  I&O  Ice chips and sips as tolerated  EFM per protocol  Maintain IV as ordered  Antibiotics as needed per protocol  Informed consent    - See additional Care Plan goals for specific interventions  9/13/2024 2218 by Nicolasa Whalen RN  Outcome: Completed  9/13/2024 1929 by Nicolasa Whalen RN  Outcome: Progressing  Goal: Patient/Family Short Term Goal  Description: Patient's Short Term Goal: Adequate pain control with delivery of infant  Interventions:  Pain assessment scores as ordered  Patient scores pain a \"3\" or less  Multidisciplinary care   Nonpharmacologic comfort measures      - See additional Care Plan goals for specific interventions  9/13/2024 2218 by Nicolasa Whalen RN  Outcome: Completed  9/13/2024 1929 by Nicolasa Whalen RN  Outcome: Progressing     Problem: SAFETY ADULT - FALL  Goal: Free from fall injury  Description: INTERVENTIONS:  - Assess pt frequently for physical needs  - Identify cognitive and physical deficits and behaviors that affect risk of falls.  - [place] fall precautions as indicated by assessment.  - Educate pt/family on patient safety including physical limitations  - Instruct pt to call for assistance with activity based on assessment  - Modify environment to reduce risk of injury  - Provide assistive devices as appropriate  - Consider OT/PT consult to assist with strengthening/mobility  - Encourage toileting schedule  9/13/2024 2218 by Nicolasa Whalen RN  Outcome: Completed  9/13/2024 1929 by Nicolasa Whalen  RN  Outcome: Progressing

## 2024-09-14 NOTE — L&D DELIVERY NOTE
Ariana Girl [[id number]]      Labor Events     labor?: No   steroids?: None  Antibiotics received during labor?: Yes  Antibiotics (enter # doses in comment): ampicillin  Rupture date/time: 2024 170     Rupture type: AROM  Fluid color: Clear  Labor type: Induced Onset of Labor  Induction: Oxytocin  Indications for induction: IDDM/GDDM       [place] Presentation    Presentation: Vertex  Position: Left Occiput Anterior       Operative Delivery    Operative Vaginal Delivery: No                Shoulder Dystocia    Shoulder Dystocia: No       Anesthesia    Method: Epidural               Delivery      Delivery date/time:  24 21:03:00   Delivery type: Normal spontaneous vaginal delivery    Details:     Delivery location: delivery room       Delivery Providers    Delivering Clinician: Geetha Hale MD   Delivery personnel:  Provider Role   Milagros Anderson RN Baby Nurse   Nicolasa Whalen RN Delivery Nurse             Cord    Vessels: 3 Vessels  Complications: None  Gases sent?: No       Resuscitation    Method: None       [place] Measurements      Weight: 3240 g 7 lb 2.3 oz Length: 53.3 cm     Head circum.: 34 cm Chest circum.: 32.5 cm      Abdominal circum.: 32 cm           Placenta    Date/time: 2024  Removal: Spontaneous  Appearance: Intact       Apgars    Living status: Living   Apgar Scoring Key:    0 1 2    Skin color Blue or pale Acrocyanotic Completely pink    Heart rate Absent <100 bpm >100 bpm    Reflex irritability No response Grimace Cry or active withdrawal    Muscle tone Limp Some flexion Active motion    Respiratory effort Absent Weak cry; hypoventilation Good, crying              1 Minute:  5 Minute:  10 Minute:  15 Minute:  20 Minute:      Skin color: 0  1       Heart rate: 2  2       Reflex irritablity: 2  2       Muscle tone: 2  2       Respiratory effort: 2  2       Total: 8  9          Apgars assigned by: MILAGROS CASE RN       Skin to Skin    No data  filed       Vaginal Count    Initial count RN: Ruddy Vasquez RN  Initial count Tech: Addis Small    Initial counts 11   0    Final counts 11       Final count RN: Nicolasa Whalen, RN  Final count MD: Geetha Hale MD       Lacerations    Episiotomy: None  Perineal lacerations: 1st Repaired?: Yes     Vaginal laceration?: No      Cervical laceration?: No    Clitoral laceration?: No                  Vaginal Delivery Note          Luba Lane Patient Status:  Inpatient    1990 MRN [id number]   Location [facility] LABOR & DELIVERY Attending Geetha Hale MD   Hosp Day # 0 PCP Unknown Pcp     Date of Delivery: 24    Pre Op Dx:  IUP at Term    Post Op Dx: Same - delivered    Op: Normal Spontaneous Vaginal Delivery    Surgeon: Geetha Hale MD        Anesthesia: Epidural      Indications:  Patient is a 34 year old  at 38w1d who presented for IOL. Pitocin was initiated. She progressed to complete cervical dilation.     Findings:    Sex: female \"Radha\"     Weight:3240g      Apgars: 8/9      Lacerations: 1st perineal laceration, no periurethral, no cervical       Procedure:  The patients was placed in the dorsolithotomy position and prepped.  She was encouraged to push.  As the head was delivered in JARED position, the legs were lowered and the perineum was supported to decrease the risk of tearing. The shoulders rotated easily and delivery was completed without complication.  Bulb suction was performed.  The cord was doubly clamped then cut after 30 seconds of cord pulsation noted.  The baby was placed on the mother's abdomen at her request. The cord blood was sampled. Placenta delivered spontaneosly by uterine massage. IV Pitocin was initiated. The uterus was explored and evacuation of blood clots noted. Uterus noted to be firm. Good hemostasis noted. The perineum, vaginal mucosa and cervix was then examined.     The 1st degree perineal laceration  repaired with 2-0 rapide vicryl.  Bleeding noted from the left vaginal side wall near repaired perineal laceration. Interrupted sutures of 2-0 vicryl placed. Bleeding was minimal.  The patient was then moved to the supine position in stable condition.  Sponge and instrument counts were correct.    Complications:  None     Mother and infant in good condition.    Geetha Hale MD   [facility] - OBGYN            Note to patient and family   The 21st Century Cures Act makes medical notes available to patients in the interest of transparency.  However, please be advised that this is a medical document.  It is intended as [id number] communication.  It is written and medical language may contain abbreviations or verbiage that are technical and unfamiliar.  It may appear blunt or direct.  Medical documents are intended to carry relevant information, facts as evident, and the clinical opinion of the practitioner.

## 2024-09-14 NOTE — PROGRESS NOTES
OB progress note    34 year old  female PPD#1 s/p  viable female infant at 38w1d on 24 after IOL GDM insulin, AMA, obesity. Anemia (Hb 9.9 on admission). H/o pre-eclampsia, GBS bacteriuria. Diagnosed intrapartum with pre-eclampsia without severe features     Pre-eclampsia without severe features  -diagnosed intrapartum  -BP mild range on admission. No antihypertensives yet. Pre-hypertensive range this morning  -Admission labs: Plt 218, LFTs ok, Cr 0.76, uric 5.4, Urine P/C 0.31     Anemia  -Hb 9.9 on admission with ferritin 5.3  -s/p  mL  - Hb 8.0  -IV iron x 1 dose ordered 2024      GDM insulin  -accuchecks & orders per MFM    Afebrile  Pain controlled  Rh+, GBS +  Ambulation encouraged  Disposition inpatient. Pending blood pressures     Subjective: Pain controlled. Lochia normal. Eating, ambulating, voiding without difficulty. Is breastfeeding. No headaches, vision changes, epigastric pain, SOB, chest pain, leg pain. Swelling in feet and ankles is not too bothersome. Declines lasix.     Vitals:    24 2300 24 2315 24 0000 24 0754   BP: 146/78 125/76 121/84 134/80   BP Location:   Right arm Right arm   Pulse: 120 (!) 132 102 78   Resp:   16 16   Temp:   98.5 °F (36.9 °C) 98.1 °F (36.7 °C)   TempSrc:   Oral Oral   SpO2:       Weight:       Height:         Temp:  [97.7 °F (36.5 °C)-98.5 °F (36.9 °C)] 98.1 °F (36.7 °C)  Pulse:  [] 78  Resp:  [16] 16  BP: ()/() 134/80  SpO2:  [99 %-100 %] 100 %      docusate  100 mg Oral BID@0600,1800       [unfilled]    Exam:  Gen A&O, NAD  CV RRR  Lungs CTAB  Abd soft, nontender, fundus firm under umbilicus  Ext nontender, 1+ to 2+ edema in feet and ankles    Component      Latest Ref Rng 2024  7:44 AM   POC GLUCOSE      70 - 99 mg/dL 84        Component      Latest Ref Rn 2024  7:42 AM 2024  8:57 AM 2024  9:37 AM   WBC      4.0 - 11.0 x10(3) uL 8.5      RBC      3.80 - 5.30 x10(6)uL 3.93       Hemoglobin      12.0 - 16.0 g/dL 9.9 (L)      Hematocrit      35.0 - 48.0 % 29.4 (L)      Platelet Count      150.0 - 450.0 10(3)uL 218.0      MCV      80.0 - 100.0 fL 74.8 (L)      MCH      26.0 - 34.0 pg 25.2 (L)      MCHC      31.0 - 37.0 g/dL 33.7      RDW      % 14.0      Prelim Neutrophil Abs      1.50 - 7.70 x10 (3) uL 5.84      Neutrophils Absolute      1.50 - 7.70 x10(3) uL 5.84      Lymphocytes Absolute      1.00 - 4.00 x10(3) uL 1.63      Monocytes Absolute      0.10 - 1.00 x10(3) uL 0.64      Eosinophils Absolute      0.00 - 0.70 x10(3) uL 0.18      Basophils Absolute      0.00 - 0.20 x10(3) uL 0.03      Immature Granulocyte Absolute      0.00 - 1.00 x10(3) uL 0.14      Neutrophils %      % 68.9      Lymphocytes %      % 19.3      Monocytes %      % 7.6      Eosinophils %      % 2.1      Basophils %      % 0.4      Immature Granulocyte %      % 1.7      Glucose      70 - 99 mg/dL  71     Sodium      136 - 145 mmol/L  139     Potassium      3.5 - 5.1 mmol/L  4.0     Chloride      98 - 112 mmol/L  109     Carbon Dioxide, Total      21.0 - 32.0 mmol/L  20.0 (L)     ANION GAP      0 - 18 mmol/L  10     BUN      9 - 23 mg/dL  9     CREATININE      0.55 - 1.02 mg/dL  0.76     CALCIUM      8.7 - 10.4 mg/dL  9.3     CALCULATED OSMOLALITY      275 - 295 mOsm/kg  285     EGFR      >=60 mL/min/1.73m2  104     AST (SGOT)      <34 U/L  11     ALT (SGPT)      10 - 49 U/L  10     ALKALINE PHOSPHATASE      37 - 98 U/L  173 (H)     Total Bilirubin      0.3 - 1.2 mg/dL  0.3     PROTEIN, TOTAL      5.7 - 8.2 g/dL  6.3     Albumin      3.2 - 4.8 g/dL  3.6     Globulin      2.0 - 3.5 g/dL  2.7     A/G Ratio      1.0 - 2.0   1.3     Patient Fasting for CMP?  No     TOTAL PROTEIN URINE RANDOM      <14.0 mg/dL   7.1    CREATININE UR RANDOM      mg/dL   22.90    Urine Protein/Creatinine Ratio, Random   0.31    FERRITIN      50.0 - 306.0 ng/mL 5.3 (L)      URIC ACID      3.1 - 7.8 mg/dL  5.4        Component      Latest Ref  Rng 9/14/2024  10:16 AM   WBC      4.0 - 11.0 x10(3) uL 10.1    RBC      3.80 - 5.30 x10(6)uL 3.21 (L)    Hemoglobin      12.0 - 16.0 g/dL 8.0 (L)    Hematocrit      35.0 - 48.0 % 24.2 (L)    Platelet Count      150.0 - 450.0 10(3)uL 179.0    MCV      80.0 - 100.0 fL 75.4 (L)    MCH      26.0 - 34.0 pg 24.9 (L)    MCHC      31.0 - 37.0 g/dL 33.1    RDW      % 14.4    Prelim Neutrophil Abs      1.50 - 7.70 x10 (3) uL 7.57    Neutrophils Absolute      1.50 - 7.70 x10(3) uL 7.57    Lymphocytes Absolute      1.00 - 4.00 x10(3) uL 1.73    Monocytes Absolute      0.10 - 1.00 x10(3) uL 0.58    Eosinophils Absolute      0.00 - 0.70 x10(3) uL 0.08    Basophils Absolute      0.00 - 0.20 x10(3) uL 0.03    Immature Granulocyte Absolute      0.00 - 1.00 x10(3) uL 0.11    Neutrophils %      % 75.0    Lymphocytes %      % 17.1    Monocytes %      % 5.7    Eosinophils %      % 0.8    Basophils %      % 0.3    Immature Granulocyte %      % 1.1       Legend:  (L) Rory Fallon MD

## 2024-09-14 NOTE — PROGRESS NOTES
Report received from L&D, RN, Nicolasa. Patient admitted to room via wheel chair. Oriented to room. Safety precautions initiated. Call light within reach. Teaching and care plan initiated.  Patient instructed to call staff for assistance to go to the bathroom. Patient verbalizes understanding. ID bands matched and confirmed.

## 2024-09-14 NOTE — DISCHARGE INSTRUCTIONS
Hypertension related to pregnancy/postpartum    -Watch for symptoms of pre-eclampsia (severe or persistent headache not relieved with a dose of tylenol, visual disturbances, persistent or severe upper abdominal pain, shortness of breath, chest pain)  -Please obtain a blood pressure cuff for home from the list of [place] Blood Pressure Validated Device Listing- see www.validatebp.org  -Check blood pressure (and heart rate if you can) 2-3 times per day & more frequently if feeling poorly. Keep log & bring to visits.      If BP is 140/90 or higher (either number) you will likely be prescribed oral antihypertensive medication. Check blood pressure before a dose of medication. Can hold the medicine if you feel your blood pressure is getting too low (less than 110/70) or you are lightheaded.      If BP is 160/110 (either number) or higher, or you develop symptoms of pre-eclampsia, please immediately go to the emergency department at [facility] & let them know you may have pre-eclampsia. You will likely require IV antihypertensives and IV magnesium sulfate to prevent stroke and seizures.      Nothing in the vagina for 6 weeks   No strenuous activity/exercise  May shower immediately. May take bath  Keep wound(s) clean and dry. Wash daily with warm water & soap. Do not scrub. Gently pat dry. May cover with clean gauze or pad if needed.     AVOID CONSTIPATION:   -Take Miralax one capful in water or juice each morning.  You can also take each evening iif needed.  -Take Fiber supplement along with Miralax as well.  -May also take milk of magnesia or Dulcolax over the counter if needing to have a BM more urgently than Miralax is providing    -Do NOT strain for bowel movements    Please call office if:  -fever 100.4 or higher    Please proceed to the Emergency Department at [facility] for any of the following:   -vaginal bleeding soaking greater than 1 pad per hour  -severe pelvic pain  -shortness of breath  -chest  pain  -leg pain or swelling     Detail Level: Detailed

## 2024-09-14 NOTE — PLAN OF CARE
Problem: POSTPARTUM  Goal: Long Term Goal:Experiences normal postpartum course  Description: INTERVENTIONS:  - Assess and monitor vital signs and lab values.  - Assess fundus and lochia.  - Provide ice/sitz baths for perineum discomfort.  - Monitor healing of incision/episiotomy/laceration, and assess for signs and symptoms of infection and hematoma.  - Assess bladder function and monitor for bladder distention.  - Provide/instruct/assist with pericare as needed.  - Provide VTE prophylaxis as needed.  - Monitor bowel function.  - Encourage ambulation and provide assistance as needed.  - Assess and monitor emotional status and provide social service/psych resources as needed.  - Utilize standard precautions and use personal protective equipment as indicated. Ensure aseptic care of all intravenous lines and invasive tubes/drains.  - Obtain immunization and exposure to communicable diseases history.  9/14/2024 0815 by Eda Hummel RN  Outcome: Progressing  9/14/2024 0815 by Eda Hummel, RN  Outcome: Progressing  Goal: Optimize infant feeding at the breast  Description: INTERVENTIONS:  - Initiate breast feeding within first hour after birth.   - Monitor effectiveness of current breast feeding efforts.  - Assess support systems available to mother/family.  - Identify cultural beliefs/practices regarding lactation, letdown techniques, maternal food preferences.  - Assess mother's knowledge and previous experience with breast feeding.  - Provide information as needed about early infant feeding cues (e.g., rooting, lip smacking, sucking fingers/hand) versus late cue of crying.  - Discuss/demonstrate breast feeding aids (e.g., infant sling, nursing footstool/pillows, and breast pumps).  - Encourage mother/other family members to express feelings/concerns, and actively listen.  - Educate father/SO about benefits of breast feeding and how to manage common lactation challenges.  - Recommend avoidance of specific  medications or substances incompatible with breast feeding.  - Assess and monitor for signs of nipple pain/trauma.  - Instruct and provide assistance with proper latch.  - Review techniques for milk expression (breast pumping) and storage of breast milk. Provide pumping equipment/supplies, instructions and assistance, as needed.  - Encourage rooming-in and breast feeding on demand.  - Encourage skin-to-skin contact.  - Provide LC support as needed.  - Assess for and manage engorgement.  - Provide breast feeding education handouts and information on community breast feeding support.   2024 by Eda Hummel RN  Outcome: Progressing  2024 by Eda Hummel RN  Outcome: Progressing  Goal: Establishment of adequate milk supply with medication/procedure interruptions  Description: INTERVENTIONS:  - Review techniques for milk expression (breast pumping).   - Provide pumping equipment/supplies, instructions, and assistance until it is safe to breastfeed infant.  2024 by Eda Hummel RN  Outcome: Progressing  2024 by Eda Hummel RN  Outcome: Progressing    Goal: Appropriate maternal -  bonding  Description: INTERVENTIONS:  - Assess caregiver- interactions.  - Assess caregiver's emotional status and coping mechanisms.  - Encourage caregiver to participate in  daily care.  - Assess support systems available to mother/family.  - Provide /case management support as needed.  2024 by Eda Hummel RN  Outcome: Progressing  2024 by Eda Hummel RN  Outcome: Progressing

## 2024-09-14 NOTE — PROGRESS NOTES
Labor Analgesia Follow Up Note    Patient underwent epidural anesthesia for labor analgesia,    Placenta Date/Time: 9/13/2024  9:07 PM     Delivery Date/Time:: 9/13/2024   9:03 PM     /80 (BP Location: Right arm)   Pulse 78   Temp 98.1 °F (36.7 °C) (Oral)   Resp 16   Ht 1.626 m (5' 4\")   Wt 103 kg (227 lb)   LMP 12/21/2023 (Exact Date)   SpO2 100%   Breastfeeding Yes   BMI 38.96 kg/m²     Assessment:  Patient seen and no apparent anesthesia related complications.    Thank you for asking us to participate in the care of your patient.

## 2024-09-14 NOTE — PROGRESS NOTES
Patient up to bathroom with assist x 2.  Voided 100mL. Patient transferred to mother/baby room 2208 per wheelchair in stable condition with baby and personal belongings.  Accompanied by significant other and staff. Bands matched with Both parents and RN.  Report given to mother/baby RN Katya.

## 2024-09-14 NOTE — PROGRESS NOTES
Patient meets the diagnostic criteria for pre eclampsia without severe features. Diagnosed after admission during intrapartum. Continue to monitor for signs and symptoms of pre eclampsia with severe features.     Geetha Hale MD   [facility] - OBGYN

## 2024-09-15 VITALS
RESPIRATION RATE: 15 BRPM | TEMPERATURE: 98 F | HEART RATE: 96 BPM | WEIGHT: 227 LBS | DIASTOLIC BLOOD PRESSURE: 72 MMHG | SYSTOLIC BLOOD PRESSURE: 117 MMHG | OXYGEN SATURATION: 100 % | BODY MASS INDEX: 38.76 KG/M2 | HEIGHT: 64 IN

## 2024-09-15 LAB
GLUCOSE BLD-MCNC: 105 MG/DL (ref 70–99)
GLUCOSE BLD-MCNC: 65 MG/DL (ref 70–99)
GLUCOSE BLD-MCNC: 94 MG/DL (ref 70–99)

## 2024-09-15 RX ORDER — LABETALOL 200 MG/1
200 TABLET, FILM COATED ORAL EVERY 8 HOURS SCHEDULED
Status: DISCONTINUED | OUTPATIENT
Start: 2024-09-15 | End: 2024-09-15

## 2024-09-15 RX ORDER — LABETALOL 200 MG/1
200 TABLET, FILM COATED ORAL EVERY 8 HOURS SCHEDULED
Qty: 90 TABLET | Refills: 0 | Status: SHIPPED | OUTPATIENT
Start: 2024-09-15

## 2024-09-15 RX ORDER — ACETAMINOPHEN 160 MG/5ML
1000 SOLUTION ORAL EVERY 6 HOURS PRN
Status: DISCONTINUED | OUTPATIENT
Start: 2024-09-15 | End: 2024-09-15

## 2024-09-15 RX ORDER — ACETAMINOPHEN 160 MG/5ML
500 SOLUTION ORAL EVERY 6 HOURS PRN
Status: DISCONTINUED | OUTPATIENT
Start: 2024-09-15 | End: 2024-09-15

## 2024-09-15 RX ORDER — IBUPROFEN 100 MG/5ML
600 SUSPENSION, ORAL (FINAL DOSE FORM) ORAL EVERY 6 HOURS PRN
Qty: 30 EACH | Refills: 0 | Status: SHIPPED | OUTPATIENT
Start: 2024-09-15

## 2024-09-15 RX ORDER — NIFEDIPINE 30 MG/1
30 TABLET, EXTENDED RELEASE ORAL DAILY
Status: DISCONTINUED | OUTPATIENT
Start: 2024-09-15 | End: 2024-09-15

## 2024-09-15 NOTE — PROGRESS NOTES
OB progress note    34 year old  female PPD#2 s/p  viable female infant at 38w1d on 24 after IOL GDM insulin, AMA, obesity. Anemia (Hb 9.9 on admission). H/o pre-eclampsia, GBS bacteriuria. Diagnosed intrapartum with pre-eclampsia without severe features     Pre-eclampsia without severe features  -diagnosed intrapartum  -BP mild range on admission.   -Admission labs: Plt 218, LFTs ok, Cr 0.76, uric 5.4, Urine P/C 0.31   -9/15/24 - PPD#2 - mild range BP. Would add nifedipine XR but patient does not swallow pills. Will try labetalol 200 mg TID & patient can crush & mix into yogurt, etc   -will observe BP today & if reasonable control, may be able to go home today    Anemia  -Hb 9.9 on admission with ferritin 5.3  -s/p  mL  - Hb 8.0  -IV iron x 1 dose ordered 2024      GDM insulin  -accuchecks & orders per MFM    Afebrile  Pain controlled  Rh+, GBS +  Ambulation encouraged  Disposition - possible discharge home later today pending blood pressures     Subjective: Pain controlled. Lochia normal. Eating, ambulating, voiding without difficulty. Is breastfeeding. No headaches, vision changes, epigastric pain, SOB, chest pain, leg pain. Swelling in feet and ankles is not too bothersome.     Vitals:    09/15/24 0631 09/15/24 0800 09/15/24 1139 09/15/24 1336   BP: 119/70 140/81 131/73 127/75   BP Location: Left arm Left arm Left arm Left arm   Pulse: 90 82 66 90   Resp: 16 18 17 18   Temp:  98 °F (36.7 °C)     TempSrc:  Oral     SpO2:       Weight:       Height:         Temp:  [98 °F (36.7 °C)-98.1 °F (36.7 °C)] 98 °F (36.7 °C)  Pulse:  [66-90] 90  Resp:  [16-18] 18  BP: (114-148)/(67-81) 127/75      labetalol  200 mg Oral Q8H INOCENCIO    docusate  100 mg Oral BID@0600,1800       [unfilled]    Exam:  Gen A&O, NAD  CV RRR  Lungs CTAB  Abd soft, nontender, fundus firm under umbilicus  Ext nontender, *** 1+ to 2+ edema in feet and ankles    Component      Latest Ref Rng 9/15/2024  5:04 AM  9/15/2024  5:28 AM 9/15/2024  9:58 AM   POC GLUCOSE      70 - 99 mg/dL 65 (L)  105 (H)  94       Legend:  (L) Low  (H) High    Mita Fallon MD

## 2024-09-15 NOTE — PROGRESS NOTES
Discharged from mother baby in stable condition. Discharge instructions reviewed with mother and partner. All questions answered. Mother feels confident in care of self and infant.

## 2024-09-16 RX ORDER — PEN NEEDLE, DIABETIC 32GX 5/32"
NEEDLE, DISPOSABLE MISCELLANEOUS
Qty: 100 EACH | Refills: 0 | OUTPATIENT
Start: 2024-09-16

## 2024-09-17 ENCOUNTER — TELEPHONE (OUTPATIENT)
Dept: OBGYN UNIT | Facility: HOSPITAL | Age: 34
End: 2024-09-17

## 2024-09-17 NOTE — PROGRESS NOTES
UNABLE TO REACH MOM AT THIS TIME.  LEFT MESSAGE TO CHECK [employer] FOR ADDITIONAL INFORMATION AND TO CONTACT [facility] WITH ANY URGENT QUESTIONS AND CONCERNS.

## 2024-10-28 ENCOUNTER — POSTPARTUM (OUTPATIENT)
Dept: OBGYN CLINIC | Facility: CLINIC | Age: 34
End: 2024-10-28
Payer: COMMERCIAL

## 2024-10-28 VITALS
DIASTOLIC BLOOD PRESSURE: 74 MMHG | SYSTOLIC BLOOD PRESSURE: 118 MMHG | BODY MASS INDEX: 34 KG/M2 | WEIGHT: 197.63 LBS | HEART RATE: 86 BPM

## 2024-10-28 DIAGNOSIS — R87.810 CERVICAL HIGH RISK HUMAN PAPILLOMAVIRUS (HPV) DNA TEST POSITIVE: ICD-10-CM

## 2024-10-28 DIAGNOSIS — Z86.32 HISTORY OF INSULIN CONTROLLED GESTATIONAL DIABETES MELLITUS: Primary | ICD-10-CM

## 2024-10-28 PROBLEM — Z98.890 STATUS POST INDUCTION OF LABOR: Status: RESOLVED | Noted: 2024-09-13 | Resolved: 2024-10-28

## 2024-10-28 PROBLEM — O14.93 PRE-ECLAMPSIA IN THIRD TRIMESTER (HCC): Status: RESOLVED | Noted: 2024-09-13 | Resolved: 2024-10-28

## 2024-10-28 PROBLEM — O24.414 INSULIN CONTROLLED GESTATIONAL DIABETES MELLITUS (GDM) IN THIRD TRIMESTER (HCC): Status: RESOLVED | Noted: 2022-02-07 | Resolved: 2024-10-28

## 2024-10-28 PROBLEM — O99.213 OBESITY AFFECTING PREGNANCY IN THIRD TRIMESTER (HCC): Status: RESOLVED | Noted: 2024-02-21 | Resolved: 2024-10-28

## 2024-10-28 PROCEDURE — 3078F DIAST BP <80 MM HG: CPT | Performed by: NURSE PRACTITIONER

## 2024-10-28 PROCEDURE — 3074F SYST BP LT 130 MM HG: CPT | Performed by: NURSE PRACTITIONER

## 2024-10-28 NOTE — PROGRESS NOTES
[facility]  Obstetrics and Gynecology   Postpartum Progress Note    Subjective:     Luba Lane is a 34 year old  female who is s/p  on 2024. Her pregnancy was complicated by Advanced maternal age, group B strep, obesity, insulin dependant gestational diabetes, and pre-eclampsia requiring medication post partum. She reports doing well. Baby is doing well and bottle feeding milk that she is pumping. The patient reports vagina bleeding stopped after 2 weeks. The patient denies emotional concerns.     She stopped her blood pressure medication and notes she hasn't checked her BP brandi while but they were normal at home.    Review of Systems:  General: denies fevers, chills, fatigue and malaise.   Respiratory: denies SOB, dyspnea, cough or wheezing  Cardiovascular: denies chest pain, palpitations, exercise intolerance   GI:denies abdominal pain, diarrhea, constipation  : denies dysuria, hematuria, increased urinary frequency. denies abnormal uterine bleeding or vaginal discharge.       Objective:     Vitals:    10/28/24 1101   BP: 118/74   Pulse: 86   Weight: 197 lb 9.6 oz (89.6 kg)     Body mass index is 33.92 kg/m².    General: AAO.NAD.   CVS exam: normal peripheral perfusion  Chest: non-labored breathing, no tachypnea   Abdominal exam: soft, nontender, nondistended  Pelvic exam:   VULVA: normal appearing vulva with no masses, tenderness or lesions  PERINEUM: intact, well healed, no signs of infection.   VAGINA: normal appearing vagina with normal color and discharge, no lesions  CERVIX: normal appearing cervix without discharge or lesions  UTERUS: uterus is normal size, shape, consistency and nontender  ADNEXA: normal adnexa in size, nontender and no masses  Ext: non-tender, no edema    Labs:         Assessment:     Luba Lane is a 34 year old  female who presents for postpartum visit   Patient Active Problem List   Diagnosis    Cervical high risk human  papillomavirus (HPV) DNA test positive    History of pre-eclampsia    History of gestational diabetes    Antepartum multigravida of advanced maternal age ([facility])    Pregnancy, supervision, high-risk ([facility])    GBS bacteriuria    Pregnancy ([facility])    Anemia in pregnancy, third trimester ([facility])     (normal spontaneous vaginal delivery) ([facility])    Difficulty swallowing pills         Plan:     Postpartum exam   - s/p    - doing well, no complaints   - no abnormal findings on physical exam   - may return to normal activity     Contraception counseling   - discussion held with patient about family planning and contraception  - pt reports SO is going to have a vasectomy, she plans condoms in the meantime    History of insulin controlled gestational diabetes mellitus  - Glucose Cristela 2 HR gestational; Future     Cervical high risk human papillomavirus (HPV) DNA test positive  Schedule colposcopy as directed during pregnancy    Routine postpartum follow-up ([facility])  Return for annual in 6-12 months    All of the findings and plan were discussed with the patient.  She notes understanding and agrees with the plan of care.  All questions were answered to the best of my ability at this time.    RTC in 6 months for well woman exam or sooner if needed     ALICIA Thurston  [facility] - OBGYN           Note to patient and family   The  [employer] Cures Act makes medical notes available to patients in the interest of transparency.  However, please be advised that this is a medical document.  It is intended as [id number] communication.  It is written and medical language may contain abbreviations or verbiage that are technical and unfamiliar.  It may appear blunt or direct.  Medical documents are intended to carry relevant information, facts as evident, and the clinical opinion of the practitioner.
